# Patient Record
Sex: FEMALE | Race: WHITE | Employment: OTHER | URBAN - METROPOLITAN AREA
[De-identification: names, ages, dates, MRNs, and addresses within clinical notes are randomized per-mention and may not be internally consistent; named-entity substitution may affect disease eponyms.]

---

## 2018-11-30 ENCOUNTER — RECORDS - HEALTHEAST (OUTPATIENT)
Dept: LAB | Facility: CLINIC | Age: 78
End: 2018-11-30

## 2018-12-01 LAB — INR PPP: 1.63 (ref 0.9–1.1)

## 2018-12-02 ENCOUNTER — RECORDS - HEALTHEAST (OUTPATIENT)
Dept: LAB | Facility: CLINIC | Age: 78
End: 2018-12-02

## 2018-12-03 LAB — INR PPP: 1.84 (ref 0.9–1.1)

## 2018-12-04 ENCOUNTER — RECORDS - HEALTHEAST (OUTPATIENT)
Dept: LAB | Facility: CLINIC | Age: 78
End: 2018-12-04

## 2018-12-05 LAB
GAMMA INTERFERON BACKGROUND BLD IA-ACNC: 0.05 IU/ML
INR PPP: 2.92 (ref 0.9–1.1)
M TB IFN-G BLD-IMP: NEGATIVE
MITOGEN IGNF BCKGRD COR BLD-ACNC: 0.02 IU/ML
MITOGEN IGNF BCKGRD COR BLD-ACNC: 0.03 IU/ML
QTF INTERPRETATION: NORMAL
QTF MITOGEN - NIL: 6.68 IU/ML

## 2018-12-06 ENCOUNTER — RECORDS - HEALTHEAST (OUTPATIENT)
Dept: LAB | Facility: CLINIC | Age: 78
End: 2018-12-06

## 2018-12-07 ENCOUNTER — RECORDS - HEALTHEAST (OUTPATIENT)
Dept: LAB | Facility: CLINIC | Age: 78
End: 2018-12-07

## 2018-12-07 LAB — INR PPP: 3.89 (ref 0.9–1.1)

## 2018-12-09 ENCOUNTER — RECORDS - HEALTHEAST (OUTPATIENT)
Dept: LAB | Facility: CLINIC | Age: 78
End: 2018-12-09

## 2018-12-09 LAB
ANION GAP SERPL CALCULATED.3IONS-SCNC: 12 MMOL/L (ref 5–18)
BUN SERPL-MCNC: 20 MG/DL (ref 8–28)
CALCIUM SERPL-MCNC: 9.6 MG/DL (ref 8.5–10.5)
CHLORIDE BLD-SCNC: 104 MMOL/L (ref 98–107)
CO2 SERPL-SCNC: 25 MMOL/L (ref 22–31)
CREAT SERPL-MCNC: 1.35 MG/DL (ref 0.6–1.1)
ERYTHROCYTE [DISTWIDTH] IN BLOOD BY AUTOMATED COUNT: 15.1 % (ref 11–14.5)
GFR SERPL CREATININE-BSD FRML MDRD: 38 ML/MIN/1.73M2
GLUCOSE BLD-MCNC: 87 MG/DL (ref 70–125)
HCT VFR BLD AUTO: 41.1 % (ref 35–47)
HGB BLD-MCNC: 12.6 G/DL (ref 12–16)
INR PPP: 2.91 (ref 0.9–1.1)
MCH RBC QN AUTO: 31.4 PG (ref 27–34)
MCHC RBC AUTO-ENTMCNC: 30.7 G/DL (ref 32–36)
MCV RBC AUTO: 103 FL (ref 80–100)
PLATELET # BLD AUTO: 244 THOU/UL (ref 140–440)
PMV BLD AUTO: 11.4 FL (ref 8.5–12.5)
POTASSIUM BLD-SCNC: 4.3 MMOL/L (ref 3.5–5)
RBC # BLD AUTO: 4.01 MILL/UL (ref 3.8–5.4)
SODIUM SERPL-SCNC: 141 MMOL/L (ref 136–145)
WBC: 6.3 THOU/UL (ref 4–11)

## 2018-12-11 ENCOUNTER — RECORDS - HEALTHEAST (OUTPATIENT)
Dept: LAB | Facility: CLINIC | Age: 78
End: 2018-12-11

## 2018-12-12 LAB — INR PPP: 2.83 (ref 0.9–1.1)

## 2018-12-13 ENCOUNTER — RECORDS - HEALTHEAST (OUTPATIENT)
Dept: LAB | Facility: CLINIC | Age: 78
End: 2018-12-13

## 2018-12-14 LAB
ANION GAP SERPL CALCULATED.3IONS-SCNC: 10 MMOL/L (ref 5–18)
BUN SERPL-MCNC: 21 MG/DL (ref 8–28)
CALCIUM SERPL-MCNC: 9.9 MG/DL (ref 8.5–10.5)
CHLORIDE BLD-SCNC: 105 MMOL/L (ref 98–107)
CO2 SERPL-SCNC: 25 MMOL/L (ref 22–31)
CREAT SERPL-MCNC: 1.3 MG/DL (ref 0.6–1.1)
ERYTHROCYTE [DISTWIDTH] IN BLOOD BY AUTOMATED COUNT: 15.2 % (ref 11–14.5)
GFR SERPL CREATININE-BSD FRML MDRD: 39 ML/MIN/1.73M2
GLUCOSE BLD-MCNC: 86 MG/DL (ref 70–125)
HCT VFR BLD AUTO: 40.2 % (ref 35–47)
HGB BLD-MCNC: 12.5 G/DL (ref 12–16)
INR PPP: 1.07 (ref 0.9–1.1)
INR PPP: 3.02 (ref 0.9–1.1)
MCH RBC QN AUTO: 32 PG (ref 27–34)
MCHC RBC AUTO-ENTMCNC: 31.1 G/DL (ref 32–36)
MCV RBC AUTO: 103 FL (ref 80–100)
PLATELET # BLD AUTO: 231 THOU/UL (ref 140–440)
PMV BLD AUTO: 11.5 FL (ref 8.5–12.5)
POTASSIUM BLD-SCNC: 4.4 MMOL/L (ref 3.5–5)
RBC # BLD AUTO: 3.91 MILL/UL (ref 3.8–5.4)
SODIUM SERPL-SCNC: 140 MMOL/L (ref 136–145)
WBC: 5.9 THOU/UL (ref 4–11)

## 2023-04-03 ENCOUNTER — TRANSITIONAL CARE UNIT VISIT (OUTPATIENT)
Dept: GERIATRICS | Facility: CLINIC | Age: 83
End: 2023-04-03
Payer: COMMERCIAL

## 2023-04-03 VITALS
SYSTOLIC BLOOD PRESSURE: 147 MMHG | DIASTOLIC BLOOD PRESSURE: 87 MMHG | WEIGHT: 234.4 LBS | HEART RATE: 78 BPM | BODY MASS INDEX: 33.63 KG/M2 | OXYGEN SATURATION: 98 % | TEMPERATURE: 96.4 F | RESPIRATION RATE: 20 BRPM

## 2023-04-03 DIAGNOSIS — R53.81 PHYSICAL DECONDITIONING: ICD-10-CM

## 2023-04-03 DIAGNOSIS — F32.A DEPRESSION, UNSPECIFIED DEPRESSION TYPE: ICD-10-CM

## 2023-04-03 DIAGNOSIS — N39.0 URINARY TRACT INFECTION WITHOUT HEMATURIA, SITE UNSPECIFIED: ICD-10-CM

## 2023-04-03 DIAGNOSIS — A49.8 KLEBSIELLA PNEUMONIAE INFECTION: Primary | ICD-10-CM

## 2023-04-03 DIAGNOSIS — I10 BENIGN ESSENTIAL HYPERTENSION: ICD-10-CM

## 2023-04-03 DIAGNOSIS — I25.810 CORONARY ARTERY DISEASE INVOLVING AUTOLOGOUS ARTERY CORONARY BYPASS GRAFT WITHOUT ANGINA PECTORIS: ICD-10-CM

## 2023-04-03 DIAGNOSIS — I48.20 CHRONIC ATRIAL FIBRILLATION (H): ICD-10-CM

## 2023-04-03 DIAGNOSIS — N18.31 STAGE 3A CHRONIC KIDNEY DISEASE (H): ICD-10-CM

## 2023-04-03 DIAGNOSIS — F03.90 DEMENTIA, UNSPECIFIED DEMENTIA SEVERITY, UNSPECIFIED DEMENTIA TYPE, UNSPECIFIED WHETHER BEHAVIORAL, PSYCHOTIC, OR MOOD DISTURBANCE OR ANXIETY (H): ICD-10-CM

## 2023-04-03 DIAGNOSIS — I50.20 HEART FAILURE WITH REDUCED EJECTION FRACTION, NYHA CLASS I (H): ICD-10-CM

## 2023-04-03 DIAGNOSIS — E78.5 HYPERLIPIDEMIA, UNSPECIFIED HYPERLIPIDEMIA TYPE: ICD-10-CM

## 2023-04-03 PROCEDURE — 99309 SBSQ NF CARE MODERATE MDM 30: CPT | Performed by: NURSE PRACTITIONER

## 2023-04-03 RX ORDER — SENNOSIDES 8.6 MG
1 TABLET ORAL DAILY PRN
COMMUNITY

## 2023-04-03 RX ORDER — DULOXETIN HYDROCHLORIDE 30 MG/1
30 CAPSULE, DELAYED RELEASE ORAL DAILY
COMMUNITY

## 2023-04-03 RX ORDER — CLOTRIMAZOLE 1 %
CREAM (GRAM) TOPICAL 2 TIMES DAILY
COMMUNITY

## 2023-04-03 RX ORDER — GABAPENTIN 100 MG/1
100 CAPSULE ORAL EVERY EVENING
COMMUNITY

## 2023-04-03 RX ORDER — MIRABEGRON 25 MG/1
25 TABLET, FILM COATED, EXTENDED RELEASE ORAL DAILY
COMMUNITY

## 2023-04-03 RX ORDER — GABAPENTIN 300 MG/1
CAPSULE ORAL
COMMUNITY

## 2023-04-03 RX ORDER — TOLTERODINE 4 MG/1
4 CAPSULE, EXTENDED RELEASE ORAL DAILY
COMMUNITY
End: 2023-04-13

## 2023-04-03 RX ORDER — GABAPENTIN 300 MG/1
300 CAPSULE ORAL DAILY
COMMUNITY

## 2023-04-03 RX ORDER — ATORVASTATIN CALCIUM 20 MG/1
20 TABLET, FILM COATED ORAL AT BEDTIME
COMMUNITY

## 2023-04-03 RX ORDER — POTASSIUM CHLORIDE 750 MG/1
10 TABLET, EXTENDED RELEASE ORAL DAILY
COMMUNITY

## 2023-04-03 RX ORDER — ARIPIPRAZOLE 2 MG/1
2 TABLET ORAL DAILY
COMMUNITY

## 2023-04-03 RX ORDER — METOPROLOL SUCCINATE 100 MG/1
100 TABLET, EXTENDED RELEASE ORAL DAILY
COMMUNITY

## 2023-04-03 RX ORDER — VITAMIN B COMPLEX
1 TABLET ORAL DAILY
COMMUNITY

## 2023-04-03 RX ORDER — CARBOXYMETHYLCELLULOSE SODIUM 5 MG/ML
1 SOLUTION/ DROPS OPHTHALMIC 4 TIMES DAILY
COMMUNITY

## 2023-04-03 RX ORDER — ALBUTEROL SULFATE 90 UG/1
2 AEROSOL, METERED RESPIRATORY (INHALATION) EVERY 4 HOURS PRN
COMMUNITY

## 2023-04-03 RX ORDER — GABAPENTIN 100 MG/1
CAPSULE ORAL
COMMUNITY

## 2023-04-03 RX ORDER — SENNOSIDES 8.6 MG
1300 CAPSULE ORAL 3 TIMES DAILY
COMMUNITY

## 2023-04-03 RX ORDER — ISOSORBIDE MONONITRATE 60 MG/1
60 TABLET, EXTENDED RELEASE ORAL DAILY
COMMUNITY

## 2023-04-03 NOTE — PROGRESS NOTES
[facility] GERIATRICS  PRIMARY CARE PROVIDER AND CLINIC:  Deidra Dick NP, [address] / [facility] [address]  Chief Complaint   Patient presents with     Hospital F/U      [place] Medical Record Number:  [id number]  Place of Service where encounter took place:  [facility] (TCU) [[id number]]    Carmelina Gaspar  is a 82 year old  (1940), admitted to the above facility from  [facility] . Hospital stay 3/25/23 through 3/30/23.     HPI:    This is an 82-year-old female, with a past medical history significant for chronic atrial fibrillation, CVA, mixed Alzheimer's and vascular dementia, right vision loss, chronic kidney disease stage III, type 2 diabetes mellitus, hypothyroidism, and depression, who was admitted to [facility] 3/25/23 through 3/30/23 for dyspnea and dysuria. Antibiotics were initiated. A urine culture revealed > 100,000 CFU/ml Klebsiella pneumoniae. Became dyspneic on 3/26/23. BNP elevated. Received extra dose of IV Furosemide. A TCU stay was recommended for physical rehabilitation.     Today, patient is found resting in bed. Just finished therapy and felt it went pretty good. When asked about her hospitalization, states she fell and hurt herself, resulting in the hospitalization. Uses a walker to get around. Therapy had to make some modifications to her walker today and that made it better. States she used to live in [place], [place] when asked where she lived prior to hospitalization.     CODE STATUS/ADVANCE DIRECTIVES DISCUSSION:  No CPR- Do NOT Intubate    ALLERGIES:   Allergies   Allergen Reactions     Gabapentin      Levofloxacin      Etodolac      Oxycodone       PAST MEDICAL HISTORY:   Past Medical History:   Diagnosis Date     Atrial fibrillation (H)       PAST SURGICAL HISTORY:   has no past surgical history on file.  FAMILY HISTORY: family history is not on file.  SOCIAL HISTORY:   reports that she has quit smoking. She does not have  any smokeless tobacco history on file. She reports that she does not drink alcohol and does not use drugs.  Patient's living condition: lives in an assisted living facility - [facility]    Post Discharge Medication Reconciliation Status:   MED REC REQUIRED  Post Medication Reconciliation Status: discharge medications reconciled, continue medications without change     Current Outpatient Medications   Medication Sig     acetaminophen (ARTHRITIS PAIN APAP) 650 MG CR tablet Take 1,300 mg by mouth 3 times daily     albuterol (PROAIR HFA/PROVENTIL HFA/VENTOLIN HFA) 108 (90 Base) MCG/ACT inhaler Inhale 2 puffs into the lungs every 4 hours as needed for shortness of breath, wheezing or cough     apixaban ANTICOAGULANT (ELIQUIS) 5 MG tablet Take 5 mg by mouth 2 times daily     ARIPiprazole (ABILIFY) 2 MG tablet Take 2 mg by mouth daily     atorvastatin (LIPITOR) 20 MG tablet Take 20 mg by mouth daily     carboxymethylcellulose PF (REFRESH PLUS) 0.5 % ophthalmic solution Place 1 drop into both eyes 4 times daily     clotrimazole (LOTRIMIN) 1 % external cream Apply topically 2 times daily     DULoxetine (CYMBALTA) 30 MG capsule Take 30 mg by mouth daily AND  60 mg, oral, Once A Day, Take with 30mg capsule to equal 90mg daily     FUROSEMIDE PO Take 20 mg by mouth daily.     gabapentin (NEURONTIN) 100 MG capsule Take 100 mg by mouth every evening     gabapentin (NEURONTIN) 100 MG capsule 100 mg, oral, Once A Day, Take with 300mg capsule at 2pm to equal 400mg total     gabapentin (NEURONTIN) 300 MG capsule Take 300 mg by mouth daily 0800     gabapentin (NEURONTIN) 300 MG capsule 300 mg, oral, Once A Day, Take with 100mg capsule to equal 400mg total at 2pm     isosorbide mononitrate (IMDUR) 60 MG 24 hr tablet Take 60 mg by mouth daily     LEVOTHYROXINE SODIUM PO Take 137 mcg by mouth daily     metoprolol succinate ER (TOPROL XL) 100 MG 24 hr tablet Take 100 mg by mouth daily     mirabegron (MYRBETRIQ) 25 MG 24 hr  tablet Take 25 mg by mouth daily     OMEPRAZOLE PO Take 20 mg by mouth daily     potassium chloride ER (KLOR-CON M) 10 MEQ CR tablet Take 10 mEq by mouth daily     sennosides (SENOKOT) 8.6 MG tablet Take 1 tablet by mouth daily as needed for constipation     tolterodine ER (DETROL LA) 4 MG 24 hr capsule Take 4 mg by mouth daily     vitamin B-12 (CYANOCOBALAMIN) 1000 MCG tablet Take 1,000 mcg by mouth daily     vitamin B-Complex Take 1 tablet by mouth daily     Vitamin D3 (CHOLECALCIFEROL) 25 mcg (1000 units) tablet Take 1 tablet by mouth daily     Zinc Oxide 12 % CREA Externally apply topically 2 times daily     No current facility-administered medications for this visit.     ROS:  4 point ROS including Respiratory, CV, GI and , other than that noted in the HPI,  is negative    Vitals:  BP (!) 147/87   Pulse 78   Temp (!) 96.4  F (35.8  C)   Resp 20   Wt 106.3 kg (234 lb 6.4 oz)   SpO2 98%   BMI 33.63 kg/m    Exam:  GENERAL APPEARANCE:  Alert, in no distress  ENT:  Mouth and posterior oropharynx normal, moist mucous membranes  EYES:  EOM, conjunctivae, lids, pupils and irises normal  RESP:  respiratory effort and palpation of chest normal, lungs clear to auscultation , no respiratory distress  CV:  Palpation and auscultation of heart done , regular rate and rhythm, no murmur, rub, or gallop  ABDOMEN:  normal bowel sounds, soft, nontender, no hepatosplenomegaly or other masses  M/S:   Trace edema in BLE  SKIN:  Inspection of skin and subcutaneous tissue baseline, Palpation of skin and subcutaneous tissue baseline  NEURO:   Cranial nerves 2-12 are normal tested and grossly at patient's baseline  PSYCH:  oriented to person    Lab/Diagnostic data:  Labs done in SNF are in [place] EPIC. Please refer to them using [employer]/Care Everywhere.    ASSESSMENT/PLAN:  Klebsiella Pneumoniae Urinary Tract Infection with Urinary Incontinence. Completed antibiotics for treatment 3/30/23. Continue Mirabegron and Tolterodine as  ordered.     Heart Failure with Reduced Ejection Fraction 35% on 1/27/22. Required IV diuresis during hospitalization due to dyspnea and elevated BNP. Monitor weights daily. On Furosemide. Albuterol available PRN for shortness of breath.     Coronary Artery Disease S/P CABG, Hypertension and Hyperlipidemia with History of CVA. Followed by [facility]. Monitor blood pressure. Continue Atorvastatin, Isosorbide Mononitrate, and Metoprolol as ordered. Also on Furosemide.     Chronic Atrial Fibrillation. Monitor heart rate daily. Continue Apixaban and Metoprolol as ordered.    Type 2 Diabetes Mellitus. Last A1C 6.5 on 7/20/22. Diet-controlled. Monitor blood sugars PRN is symptomatic.     Mixed Alzheimer's and Vascular Dementia. Occupational Therapy to evaluate cognitive status. L[facility] at [facility].    Depression. Seen by PCP on 3/9/23 for worsening depression. PHQ-9 Score 11. Started on Aripiprazole. Continue Duloxetine as ordered.    Chronic Back Pain with Left Hip Osteoarthritis. Has received steroid injections in the left hip in the past. Continue Acetaminophen and Gabapentin as ordered.    Chronic Kidney Disease Stage III. Baseline Creatinine low 1s. Last Creatinine 1.28 on 3/30/23. Repeat BMP to ensure stability.     Hypothyroidism. Last TSH 1.37 on 7/20/22. Continue Levothyroxine as ordered.    Hypokalemia. Secondary to diuretic. Last Potassium 4.3 on 3/27/23. Continue Potassium Chloride as ordered.    Gastroesophageal Reflux Disease with History of Achalasia of Esophagus. Continue Omeprazole as ordered.    Constipation. Continue Senna as ordered.    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 Followed by Retina Clinic at [facility]. Receives KERRI injection every 4 weeks. Also on Refresh Tears.    Physical Deconditioning. Secondary to recent hospitalization and co-morbidities. Physical and Occupational Therapy ordered.    Orders:  BMP on 4/11/23  Daily  weights    Electronically signed by:  VISH Hutchinson CNP

## 2023-04-03 NOTE — LETTER
4/3/2023        RE: Carmelina Gaspar  [id number]  [facility] [address]        [facility] GERIATRICS  PRIMARY CARE PROVIDER AND CLINIC:  Deidra Dick NP, [address] / [facility] [address]  Chief Complaint   Patient presents with     Hospital F/U      [place] Medical Record Number:  [id number]  [facility] where [facility] [[id number]    Carmelina Gaspar  is a 82 year old  (1940), admitted to the above facility from  [facility] . Hospital stay 3/25/23 through 3/30/23.     HPI:    This is an 82-year-old female, with a past medical history significant for chronic atrial fibrillation, CVA, mixed Alzheimer's and vascular dementia, right vision loss, chronic kidney disease stage III, type 2 diabetes mellitus, hypothyroidism, and depression, who was admitted to [facility] 3/25/23 through 3/30/23 for dyspnea and dysuria. Antibiotics were initiated. A urine culture revealed > 100,000 CFU/ml Klebsiella pneumoniae. Became dyspneic on 3/26/23. BNP elevated. Received extra dose of IV Furosemide. A TCU stay was recommended for physical rehabilitation.     Today, patient is found resting in bed. Just finished therapy and felt it went pretty good. When asked about her hospitalization, states she fell and hurt herself, resulting in the hospitalization. Uses a walker to get around. Therapy had to make some modifications to her walker today and that made it better. States she used to live in [place], [place] when asked where she lived prior to hospitalization.     CODE STATUS/ADVANCE DIRECTIVES DISCUSSION:  No CPR- Do NOT Intubate    ALLERGIES:   Allergies   Allergen Reactions     Gabapentin      Levofloxacin      Etodolac      Oxycodone       PAST MEDICAL HISTORY:   Past Medical History:   Diagnosis Date     Atrial fibrillation (H)       PAST SURGICAL HISTORY:   has no past surgical history on file.  FAMILY HISTORY: family  history is not on file.  SOCIAL HISTORY:   reports that she has quit smoking. She does not have any smokeless tobacco history on file. She reports that she does not drink alcohol and does not use drugs.  Patient's living condition: lives in an assisted living facility - [facility]    Post Discharge Medication Reconciliation Status:   MED REC REQUIRED  Post Medication Reconciliation Status: discharge medications reconciled, continue medications without change     Current Outpatient Medications   Medication Sig     acetaminophen (ARTHRITIS PAIN APAP) 650 MG CR tablet Take 1,300 mg by mouth 3 times daily     albuterol (PROAIR HFA/PROVENTIL HFA/VENTOLIN HFA) 108 (90 Base) MCG/ACT inhaler Inhale 2 puffs into the lungs every 4 hours as needed for shortness of breath, wheezing or cough     apixaban ANTICOAGULANT (ELIQUIS) 5 MG tablet Take 5 mg by mouth 2 times daily     ARIPiprazole (ABILIFY) 2 MG tablet Take 2 mg by mouth daily     atorvastatin (LIPITOR) 20 MG tablet Take 20 mg by mouth daily     carboxymethylcellulose PF (REFRESH PLUS) 0.5 % ophthalmic solution Place 1 drop into both eyes 4 times daily     clotrimazole (LOTRIMIN) 1 % external cream Apply topically 2 times daily     DULoxetine (CYMBALTA) 30 MG capsule Take 30 mg by mouth daily AND  60 mg, oral, Once A Day, Take with 30mg capsule to equal 90mg daily     FUROSEMIDE PO Take 20 mg by mouth daily.     gabapentin (NEURONTIN) 100 MG capsule Take 100 mg by mouth every evening     gabapentin (NEURONTIN) 100 MG capsule 100 mg, oral, Once A Day, Take with 300mg capsule at 2pm to equal 400mg total     gabapentin (NEURONTIN) 300 MG capsule Take 300 mg by mouth daily 0800     gabapentin (NEURONTIN) 300 MG capsule 300 mg, oral, Once A Day, Take with 100mg capsule to equal 400mg total at 2pm     isosorbide mononitrate (IMDUR) 60 MG 24 hr tablet Take 60 mg by mouth daily     LEVOTHYROXINE SODIUM PO Take 137 mcg by mouth daily     metoprolol succinate ER  (TOPROL XL) 100 MG 24 hr tablet Take 100 mg by mouth daily     mirabegron (MYRBETRIQ) 25 MG 24 hr tablet Take 25 mg by mouth daily     OMEPRAZOLE PO Take 20 mg by mouth daily     potassium chloride ER (KLOR-CON M) 10 MEQ CR tablet Take 10 mEq by mouth daily     sennosides (SENOKOT) 8.6 MG tablet Take 1 tablet by mouth daily as needed for constipation     tolterodine ER (DETROL LA) 4 MG 24 hr capsule Take 4 mg by mouth daily     vitamin B-12 (CYANOCOBALAMIN) 1000 MCG tablet Take 1,000 mcg by mouth daily     vitamin B-Complex Take 1 tablet by mouth daily     Vitamin D3 (CHOLECALCIFEROL) 25 mcg (1000 units) tablet Take 1 tablet by mouth daily     Zinc Oxide 12 % CREA Externally apply topically 2 times daily     No current facility-administered medications for this visit.     ROS:  4 point ROS including Respiratory, CV, GI and , other than that noted in the HPI,  is negative    Vitals:  BP (!) 147/87   Pulse 78   Temp (!) 96.4  F (35.8  C)   Resp 20   Wt 106.3 kg (234 lb 6.4 oz)   SpO2 98%   BMI 33.63 kg/m    Exam:  GENERAL APPEARANCE:  Alert, in no distress  ENT:  Mouth and posterior oropharynx normal, moist mucous membranes  EYES:  EOM, conjunctivae, lids, pupils and irises normal  RESP:  respiratory effort and palpation of chest normal, lungs clear to auscultation , no respiratory distress  CV:  Palpation and auscultation of heart done , regular rate and rhythm, no murmur, rub, or gallop  ABDOMEN:  normal bowel sounds, soft, nontender, no hepatosplenomegaly or other masses  M/S:   Trace edema in BLE  SKIN:  Inspection of skin and subcutaneous tissue baseline, Palpation of skin and subcutaneous tissue baseline  NEURO:   Cranial nerves 2-12 are normal tested and grossly at patient's baseline  PSYCH:  oriented to person    Lab/Diagnostic data:  Labs done in SNF are in [place] EPIC. Please refer to them using [employer]/Care Everywhere.    ASSESSMENT/PLAN:  Klebsiella Pneumoniae Urinary Tract Infection with Urinary  Incontinence. Completed antibiotics for treatment 3/30/23. Continue Mirabegron and Tolterodine as ordered.     Heart Failure with Reduced Ejection Fraction 35% on 1/27/22. Required IV diuresis during hospitalization due to dyspnea and elevated BNP. Monitor weights daily. On Furosemide. Albuterol available PRN for shortness of breath.     Coronary Artery Disease S/P CABG, Hypertension and Hyperlipidemia with History of CVA. Followed by [facility]. Monitor blood pressure. Continue Atorvastatin, Isosorbide Mononitrate, and Metoprolol as ordered. Also on Furosemide.     Chronic Atrial Fibrillation. Monitor heart rate daily. Continue Apixaban and Metoprolol as ordered.    Type 2 Diabetes Mellitus. Last A1C 6.5 on 7/20/22. Diet-controlled. Monitor blood sugars PRN is symptomatic.     Mixed Alzheimer's and Vascular Dementia. Occupational Therapy to evaluate cognitive status. Lived at [facility].    Depression. Seen by PCP on 3/9/23 for worsening depression. PHQ-9 Score 11. Started on Aripiprazole. Continue Duloxetine as ordered.    Chronic Back Pain with Left Hip Osteoarthritis. Has received steroid injections in the left hip in the past. Continue Acetaminophen and Gabapentin as ordered.    Chronic Kidney Disease Stage III. Baseline Creatinine low 1s. Last Creatinine 1.28 on 3/30/23. Repeat BMP to ensure stability.     Hypothyroidism. Last TSH 1.37 on 7/20/22. Continue Levothyroxine as ordered.    Hypokalemia. Secondary to diuretic. Last Potassium 4.3 on 3/27/23. Continue Potassium Chloride as ordered.    Gastroesophageal Reflux Disease with History of Achalasia of Esophagus. Continue Omeprazole as ordered.    Constipation. Continue Senna as ordered.    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 Followed by [facility] Clinic at [facility]. Receives KERRI injection every 4 weeks. Also on Refresh Tears.    Physical Deconditioning. Secondary to recent hospitalization and  co-morbidities. Physical and Occupational Therapy ordered.    Orders:  BMP on 4/11/23  Daily weights    Electronically signed by:  VISH Hutchinson CNP                       Sincerely,        VISH Hutchinson CNP

## 2023-04-06 ENCOUNTER — TRANSITIONAL CARE UNIT VISIT (OUTPATIENT)
Dept: GERIATRICS | Facility: CLINIC | Age: 83
End: 2023-04-06
Payer: COMMERCIAL

## 2023-04-06 VITALS
DIASTOLIC BLOOD PRESSURE: 95 MMHG | SYSTOLIC BLOOD PRESSURE: 142 MMHG | BODY MASS INDEX: 33.63 KG/M2 | WEIGHT: 234.4 LBS | RESPIRATION RATE: 20 BRPM | TEMPERATURE: 96.7 F | OXYGEN SATURATION: 96 % | HEART RATE: 79 BPM

## 2023-04-06 DIAGNOSIS — R53.81 PHYSICAL DECONDITIONING: ICD-10-CM

## 2023-04-06 DIAGNOSIS — I10 BENIGN ESSENTIAL HYPERTENSION: ICD-10-CM

## 2023-04-06 DIAGNOSIS — I50.20 HEART FAILURE WITH REDUCED EJECTION FRACTION, NYHA CLASS I (H): Primary | ICD-10-CM

## 2023-04-06 PROCEDURE — 99309 SBSQ NF CARE MODERATE MDM 30: CPT | Performed by: NURSE PRACTITIONER

## 2023-04-06 NOTE — PROGRESS NOTES
[facility] GERIATRICS    Chief Complaint   Patient presents with     RECHECK     HPI:  Carmelina Gaspar is a 82 year old  (1940), who is being seen today for an episodic care visit at: [facility] [[id number]].    Background:    This is an 82-year-old female, with a past medical history significant for chronic atrial fibrillation, CVA, mixed Alzheimer's and vascular dementia, right vision loss, chronic kidney disease stage III, type 2 diabetes mellitus, hypothyroidism, and depression, who was admitted to [facility] 3/25/23 through 3/30/23 for dyspnea and dysuria. Antibiotics were initiated. A urine culture revealed > 100,000 CFU/ml Klebsiella pneumoniae. Became dyspneic on 3/26/23. BNP elevated. Received extra dose of IV Furosemide. A TCU stay was recommended for physical rehabilitation.    Today's concern is:     Physical Deconditioning. Working with Physical and Occupational Therapy. Upon review of documentation, CGA needed with transfers. Ambulating 35 feet with FWW and CGA. Moderate assistance needed with lower body dressing. SLUMS 3/30. Safety Questionnaire 7/12.     Heart Failure with Reduced Ejection Fraction 35% on 1/27/22. Denies shortness of breath. Upon review of weights, 234.4 lbs on 3/30/23. Due for repeat weight on 4/7/23. Hospital discharge weight 235 lbs on 3/25/23.    Hypertension. Upon review of blood pressures over the past 5 days, systolic range from 111-160, most < 150. Diastolic range from 68-95.    Allergies, and PMH/PSH reviewed in EPIC today.  REVIEW OF SYSTEMS:  4 point ROS including Respiratory, CV, GI and , other than that noted in the HPI,  is negative    Objective:   BP (!) 142/95   Pulse 79   Temp (!) 96.7  F (35.9  C)   Resp 20   Wt 106.3 kg (234 lb 6.4 oz)   SpO2 96%   BMI 33.63 kg/m    GENERAL APPEARANCE:  Alert, in no distress  ENT:  Mouth and posterior oropharynx normal, moist mucous membranes  EYES:  EOM, conjunctivae, lids, pupils and  irises normal  RESP:  respiratory effort and palpation of chest normal, lungs clear to auscultation , no respiratory distress  CV:  Palpation and auscultation of heart done , regular rate and rhythm, no murmur, rub, or gallop  ABDOMEN:  normal bowel sounds, soft, nontender, no hepatosplenomegaly or other masses  M/S:   Trace edema in BLE  SKIN:  Inspection of skin and subcutaneous tissue baseline, Palpation of skin and subcutaneous tissue baseline  NEURO:   Cranial nerves 2-12 are normal tested and grossly at patient's baseline  PSYCH:  memory impaired     Labs done in SNF are in [facility]. Please refer to them using [employer]/Care Everywhere.    Assessment/Plan:  Klebsiella Pneumoniae Urinary Tract Infection with Urinary Incontinence. Completed antibiotic treatment 3/30/23. Continue Mirabegron and Tolterodine as ordered.      Heart Failure with Reduced Ejection Fraction 35% on 1/27/22. Required IV diuresis during hospitalization due to dyspnea and elevated BNP. Monitor weights. On Furosemide. Albuterol available PRN for shortness of breath.      Coronary Artery Disease S/P CABG, Hypertension and Hyperlipidemia with History of CVA. Followed by [facility]. Most blood pressures < 150/90. Continue Atorvastatin, Isosorbide Mononitrate, and Metoprolol as ordered. Also on Furosemide.      Chronic Atrial Fibrillation. Monitor heart rate daily. Continue Apixaban and Metoprolol as ordered.     Type 2 Diabetes Mellitus. Last A1C 6.5 on 7/20/22. Diet-controlled. Monitor blood sugars PRN is symptomatic.      Mixed Alzheimer's and Vascular Dementia. SLUMS Score 3/30 per OT testing. Lived at [facility].     Depression. Seen by PCP on 3/9/23 for worsening depression. PHQ-9 Score 11. Started on Aripiprazole. Continue Duloxetine as ordered.     Chronic Back Pain with Left Hip Osteoarthritis. Has received steroid injections in the left hip in the past. Continue Acetaminophen and Gabapentin as  ordered.     Chronic Kidney Disease Stage III. Baseline Creatinine low 1s. Last Creatinine 1.28 on 3/30/23. Monitor periodically to ensure stability.      Hypothyroidism. Last TSH 1.37 on 7/20/22. Continue Levothyroxine as ordered.     Hypokalemia. Secondary to diuretic. Last Potassium 4.3 on 3/27/23. Continue Potassium Chloride as ordered.     Gastroesophageal Reflux Disease with History of Achalasia of Esophagus. Continue Omeprazole as ordered.     Constipation. Continue Senna as ordered.     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 [facility]. Receives KERRI injection every 4 weeks. Also on Refresh Tears.     Physical Deconditioning. Secondary to recent hospitalization and co-morbidities. Physical and Occupational Therapy ordered.    Orders:  None    Electronically signed by: VISH Hutchinson CNP

## 2023-04-06 NOTE — LETTER
4/6/2023        RE: Carmelina Gaspar  [address]  [facility] [address]        [facility] GERIATRICS    Chief Complaint   Patient presents with     RECHECK     HPI:  Carmelina Gaspar is a 82 year old  (1940), who is being seen today for an episodic care visit at: [facility] ([facility]) [[id number]].    Background:    This is an 82-year-old female, with a past medical history significant for chronic atrial fibrillation, CVA, mixed Alzheimer's and vascular dementia, right vision loss, chronic kidney disease stage III, type 2 diabetes mellitus, hypothyroidism, and depression, who was admitted to [facility] 3/25/23 through 3/30/23 for dyspnea and dysuria. Antibiotics were initiated. A urine culture revealed > 100,000 CFU/ml Klebsiella pneumoniae. Became dyspneic on 3/26/23. BNP elevated. Received extra dose of IV Furosemide. A TCU stay was recommended for physical rehabilitation.    Today's concern is:     Physical Deconditioning. Working with Physical and Occupational Therapy. Upon review of documentation, CGA needed with transfers. Ambulating 35 feet with FWW and CGA. Moderate assistance needed with lower body dressing. SLUMS 3/30. Safety Questionnaire 7/12.     Heart Failure with Reduced Ejection Fraction 35% on 1/27/22. Denies shortness of breath. Upon review of weights, 234.4 lbs on 3/30/23. Due for repeat weight on 4/7/23. Hospital discharge weight 235 lbs on 3/25/23.    Hypertension. Upon review of blood pressures over the past 5 days, systolic range from 111-160, most < 150. Diastolic range from 68-95.    Allergies, and PMH/PSH reviewed in EPIC today.  REVIEW OF SYSTEMS:  4 point ROS including Respiratory, CV, GI and , other than that noted in the HPI,  is negative    Objective:   BP (!) 142/95   Pulse 79   Temp (!) 96.7  F (35.9  C)   Resp 20   Wt 106.3 kg (234 lb 6.4 oz)   SpO2 96%   BMI 33.63 kg/m    GENERAL APPEARANCE:  Alert, in no distress  ENT:  Mouth and  posterior oropharynx normal, moist mucous membranes  EYES:  EOM, conjunctivae, lids, pupils and irises normal  RESP:  respiratory effort and palpation of chest normal, lungs clear to auscultation , no respiratory distress  CV:  Palpation and auscultation of heart done , regular rate and rhythm, no murmur, rub, or gallop  ABDOMEN:  normal bowel sounds, soft, nontender, no hepatosplenomegaly or other masses  M/S:   Trace edema in BLE  SKIN:  Inspection of skin and subcutaneous tissue baseline, Palpation of skin and subcutaneous tissue baseline  NEURO:   Cranial nerves 2-12 are normal tested and grossly at patient's baseline  PSYCH:  memory impaired     Labs done in SNF are in [facility]. Please refer to them using [employer]/Care Everywhere.    Assessment/Plan:  Klebsiella Pneumoniae Urinary Tract Infection with Urinary Incontinence. Completed antibiotic treatment 3/30/23. Continue Mirabegron and Tolterodine as ordered.      Heart Failure with Reduced Ejection Fraction 35% on 1/27/22. Required IV diuresis during hospitalization due to dyspnea and elevated BNP. Monitor weights. On Furosemide. Albuterol available PRN for shortness of breath.      Coronary Artery Disease S/P CABG, Hypertension and Hyperlipidemia with History of CVA. Followed by [facility]. Most blood pressures < 150/90. Continue Atorvastatin, Isosorbide Mononitrate, and Metoprolol as ordered. Also on Furosemide.      Chronic Atrial Fibrillation. Monitor heart rate daily. Continue Apixaban and Metoprolol as ordered.     Type 2 Diabetes Mellitus. Last A1C 6.5 on 7/20/22. Diet-controlled. Monitor blood sugars PRN is symptomatic.      Mixed Alzheimer's and Vascular Dementia. SLUMS Score 3/30 per OT testing. Lived at [facility].     Depression. Seen by PCP on 3/9/23 for worsening depression. PHQ-9 Score 11. Started on Aripiprazole. Continue Duloxetine as ordered.     Chronic Back Pain with Left Hip Osteoarthritis. Has received steroid  injections in the left hip in the past. Continue Acetaminophen and Gabapentin as ordered.     Chronic Kidney Disease Stage III. Baseline Creatinine low 1s. Last Creatinine 1.28 on 3/30/23. Monitor periodically to ensure stability.      Hypothyroidism. Last TSH 1.37 on 7/20/22. Continue Levothyroxine as ordered.     Hypokalemia. Secondary to diuretic. Last Potassium 4.3 on 3/27/23. Continue Potassium Chloride as ordered.     Gastroesophageal Reflux Disease with History of Achalasia of Esophagus. Continue Omeprazole as ordered.     Constipation. Continue Senna as ordered.     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 Followed by [facility] at [facility]. Receives KERRI injection every 4 weeks. Also on Refresh Tears.     Physical Deconditioning. Secondary to recent hospitalization and co-morbidities. Physical and Occupational Therapy ordered.    Orders:  None    Electronically signed by: VISH Hutchinson CNP             Sincerely,        VISH Hutchinson CNP

## 2023-04-10 VITALS
BODY MASS INDEX: 33.2 KG/M2 | RESPIRATION RATE: 14 BRPM | SYSTOLIC BLOOD PRESSURE: 129 MMHG | WEIGHT: 231.4 LBS | DIASTOLIC BLOOD PRESSURE: 84 MMHG | HEART RATE: 68 BPM | TEMPERATURE: 97.1 F | OXYGEN SATURATION: 91 %

## 2023-04-11 ENCOUNTER — TRANSITIONAL CARE UNIT VISIT (OUTPATIENT)
Dept: GERIATRICS | Facility: CLINIC | Age: 83
End: 2023-04-11
Payer: COMMERCIAL

## 2023-04-11 DIAGNOSIS — F33.9 RECURRENT MAJOR DEPRESSIVE DISORDER, REMISSION STATUS UNSPECIFIED (H): ICD-10-CM

## 2023-04-11 DIAGNOSIS — N18.30 STAGE 3 CHRONIC KIDNEY DISEASE, UNSPECIFIED WHETHER STAGE 3A OR 3B CKD (H): ICD-10-CM

## 2023-04-11 DIAGNOSIS — R35.0 URINARY FREQUENCY: ICD-10-CM

## 2023-04-11 DIAGNOSIS — R53.81 PHYSICAL DECONDITIONING: ICD-10-CM

## 2023-04-11 DIAGNOSIS — F03.90 DEMENTIA, UNSPECIFIED DEMENTIA SEVERITY, UNSPECIFIED DEMENTIA TYPE, UNSPECIFIED WHETHER BEHAVIORAL, PSYCHOTIC, OR MOOD DISTURBANCE OR ANXIETY (H): ICD-10-CM

## 2023-04-11 DIAGNOSIS — I48.20 CHRONIC ATRIAL FIBRILLATION (H): ICD-10-CM

## 2023-04-11 DIAGNOSIS — I50.23 ACUTE ON CHRONIC SYSTOLIC HEART FAILURE (H): ICD-10-CM

## 2023-04-11 DIAGNOSIS — Z87.440 PERSONAL HISTORY OF URINARY TRACT INFECTION: Primary | ICD-10-CM

## 2023-04-11 PROCEDURE — 99305 1ST NF CARE MODERATE MDM 35: CPT | Performed by: INTERNAL MEDICINE

## 2023-04-11 NOTE — PROGRESS NOTES
"[place] GERIATRIC SERVICES  PHYSICIAN NOTE    PRIMARY CARE PROVIDER AND CLINIC:  Deidra Dick NP, [address] / [facility] [address]    Chief Complaint   Patient presents with     Hospital F/U     [place] Medical Record Number:  [id number]  Place of Service where encounter took place:  [facility] (U) [[id number]]    Carmelina Gaspar is a 82 year old (1940), admitted to the above facility from  [facility] . Hospital stay 3/25/23 through 3/30/23. Admitted to this facility for  rehab, medical management and nursing care.     HPI:    HPI information obtained from: facility chart records, facility staff, patient report, [facility] chart review and Care Everywhere [facility] chart review.     Brief summary of hospital course: Carmelina Gaspar is an 82yoF with h/o depression, chronic pain, dementia, afib on anticoagulation, CHF and urinary incontinence admitted from her assisted with dysuria and dyspnea found to have UTI treated with antibiotics and acute on chronic CHF with slight fluid overload s/p diuresis. Ultimately discharged with remaining Cefuroxime antibiotics and back to home dosing Lasix. Discharged to TCU for rehab/cares.    Updates on status since skilled nursing admission: Carmelina is seen in her room today and welcomes the visit. Says she is \"good\" and denies dyspnea at rest but admits to some dyspnea on exertion. Breathing comfortably on RA. No cough. No current UTI symptoms but mentions chronic urinary frequency that seems unchanged since she had started her Detrol and/or Myrbetriq. Willing to go off of Detrol given comorbid dementia and h/o dry mouth. Says she gets medication assistance at her [facility]. After our visit she asks about going out to sit in common area to be around others so I do help her get out there though she was able to self transfer. No acute nursing concerns.    CODE STATUS/ADVANCE DIRECTIVES DISCUSSION:  DNR  Patient's living condition: lives in an assisted living " facility    ALLERGIES: Gabapentin, Levofloxacin, Etodolac, and Oxycodone    Past Medical History:   Diagnosis Date     Atrial fibrillation (H)      CAD (coronary artery disease)      Central retinal vein occlusion of right eye      CKD (chronic kidney disease) stage 3, GFR 30-59 ml/min (H)      Dementia (H)      Depression      Hypothyroidism      Ischemic cardiomyopathy      Urinary incontinence       Past Surgical History:   Procedure Laterality Date     CHOLECYSTECTOMY       OTHER SURGICAL HISTORY  03/2017    Endovascular stent (Dr. Devine) for infrarenal AAA     REPLACEMENT TOTAL KNEE Bilateral        Current Outpatient Medications   Medication Sig Dispense Refill     acetaminophen (ARTHRITIS PAIN APAP) 650 MG CR tablet Take 1,300 mg by mouth 3 times daily       albuterol (PROAIR HFA/PROVENTIL HFA/VENTOLIN HFA) 108 (90 Base) MCG/ACT inhaler Inhale 2 puffs into the lungs every 4 hours as needed for shortness of breath, wheezing or cough       apixaban ANTICOAGULANT (ELIQUIS) 5 MG tablet Take 5 mg by mouth 2 times daily       ARIPiprazole (ABILIFY) 2 MG tablet Take 2 mg by mouth daily       atorvastatin (LIPITOR) 20 MG tablet Take 20 mg by mouth daily       carboxymethylcellulose PF (REFRESH PLUS) 0.5 % ophthalmic solution Place 1 drop into both eyes 4 times daily       clotrimazole (LOTRIMIN) 1 % external cream Apply topically 2 times daily       DULoxetine (CYMBALTA) 30 MG capsule Take 30 mg by mouth daily AND  60 mg, oral, Once A Day, Take with 30mg capsule to equal 90mg daily       FUROSEMIDE PO Take 20 mg by mouth daily.       gabapentin (NEURONTIN) 100 MG capsule Take 100 mg by mouth every evening       gabapentin (NEURONTIN) 100 MG capsule 100 mg, oral, Once A Day, Take with 300mg capsule at 2pm to equal 400mg total       gabapentin (NEURONTIN) 300 MG capsule Take 300 mg by mouth daily 0800       gabapentin (NEURONTIN) 300 MG capsule 300 mg, oral, Once A Day, Take with 100mg capsule to equal 400mg total at  2pm       isosorbide mononitrate (IMDUR) 60 MG 24 hr tablet Take 60 mg by mouth daily       LEVOTHYROXINE SODIUM PO Take 137 mcg by mouth daily       metoprolol succinate ER (TOPROL XL) 100 MG 24 hr tablet Take 100 mg by mouth daily       mirabegron (MYRBETRIQ) 25 MG 24 hr tablet Take 25 mg by mouth daily       OMEPRAZOLE PO Take 20 mg by mouth daily       potassium chloride ER (KLOR-CON M) 10 MEQ CR tablet Take 10 mEq by mouth daily       sennosides (SENOKOT) 8.6 MG tablet Take 1 tablet by mouth daily as needed for constipation       tolterodine ER (DETROL LA) 4 MG 24 hr capsule Take 4 mg by mouth daily       vitamin B-12 (CYANOCOBALAMIN) 1000 MCG tablet Take 1,000 mcg by mouth daily       vitamin B-Complex Take 1 tablet by mouth daily       Vitamin D3 (CHOLECALCIFEROL) 25 mcg (1000 units) tablet Take 1 tablet by mouth daily       Zinc Oxide 12 % CREA Externally apply topically 2 times daily         ROS:  Limited secondary to cognitive impairment but today pt reports as above in HPI    Exam:  /84   Pulse 68   Temp 97.1  F (36.2  C)   Resp 14   Wt 105 kg (231 lb 6.4 oz)   SpO2 91%   BMI 33.20 kg/m    Alert, pleasant, nicely groomed  Able to self transfer from one chair to another  Slightly dry mouth  Heart tones irregularly irregular rate controlled  Lungs clear  No edema  Obese abdomen  Cheerful disposition    Lab/Diagnostic data:  4/11/23 (today): Na 142, K 4.5, BUN 26, Cr 1.18 (hospital range Cr 1.0 - 1.5)  Hospital CBC: WBC 5.8, Hgb 13.6, Platelets 167, BNP 2,136  July 2022: TSH within normal limits 1.37, HgbA1c 6.5%    ASSESSMENT/PLAN:  Personal history of urinary tract infection  Urinary frequency  She ok'd discontinuation of Detrol LA as is not effective as still has urinary frequency anyway at baseline and it does have potential for s/e such as dry mouth and worsening cognitive impairment  She remains on Myrbetriq  She had no complaints today in regards to overall urinary habits and has completed  her antibiotic course      Acute on chronic systolic heart failure (H)  Stage 3 chronic kidney disease, unspecified whether stage 3a or 3b CKD (H)  Chronic atrial fibrillation (H)  Seems euvolemic today on RA with stable vitals compared to hospitalization where she was hypoxic and needed IV diuresis  Weight stable  Continues on Eliquis for afib, Lipitor, Lasix 20 mg daily  BMP today stable as noted above      Dementia, unspecified dementia severity, unspecified dementia type, unspecified whether behavioral, psychotic, or mood disturbance or anxiety (H)  Recurrent major depressive disorder, remission status unspecified (H)  Physical deconditioning  Continue physical therapy and occupational therapy to help with safe dispo  SLUMS initially 3/30 but with retest up to 12/30  Planing discharge back to previous halfway  Monitor mood on current meds; seems cheerful today though does follow with PCP on depression; Abilify is a newer medication to her regimen; also on Cymbalta  On Gabapentin as well for some chronic pain  Monitor for delirium  Discontinuing Detrol LA as noted above will also potentially help cognition        Electronically signed by:  Gisela Liao DO

## 2023-04-11 NOTE — LETTER
"    4/11/2023        RE: Carmelina Gaspar  [address]  [facility] [address]        [place] GERIATRIC SERVICES  PHYSICIAN NOTE    PRIMARY CARE PROVIDER AND CLINIC:  Deidra Dick, NP, [address] [place] [address] / [facility] [address]    Chief Complaint   Patient presents with     Hospital F/U     [place] Medical Record Number:  [id number]  Place of Service where encounter took place:  [facility] (TCU) [[id number]]    Carmelina Gaspra is a 82 year old (1940), admitted to the above facility from  [facility] . Hospital stay 3/25/23 through 3/30/23. Admitted to this facility for  rehab, medical management and nursing care.     HPI:    HPI information obtained from: facility chart records, facility staff, patient report, [facility] chart review and Care Everywhere [facility] chart review.     Brief summary of hospital course: Carmelina Gaspar is an 82yoF with h/o depression, chronic pain, dementia, afib on anticoagulation, CHF and urinary incontinence admitted from her [facility] with dysuria and dyspnea found to have UTI treated with antibiotics and acute on chronic CHF with slight fluid overload s/p diuresis. Ultimately discharged with remaining Cefuroxime antibiotics and back to home dosing Lasix. Discharged to TCU for rehab/cares.    Updates on status since skilled nursing admission: Carmelina is seen in her room today and welcomes the visit. Says she is \"good\" and denies dyspnea at rest but admits to some dyspnea on exertion. Breathing comfortably on RA. No cough. No current UTI symptoms but mentions chronic urinary frequency that seems unchanged since she had started her Detrol and/or Myrbetriq. Willing to go off of Detrol given comorbid dementia and h/o dry mouth. Says she gets medication assistance at her [facility]. After our visit she asks about going out to sit in common area to be around others so I do help her get out there though she was able to self transfer. No acute nursing concerns.    CODE " STATUS/ADVANCE DIRECTIVES DISCUSSION:  DNR  Patient's living condition: lives in an assisted living facility    ALLERGIES: Gabapentin, Levofloxacin, Etodolac, and Oxycodone    Past Medical History:   Diagnosis Date     Atrial fibrillation (H)      CAD (coronary artery disease)      Central retinal vein occlusion of right eye      CKD (chronic kidney disease) stage 3, GFR 30-59 ml/min (H)      Dementia (H)      Depression      Hypothyroidism      Ischemic cardiomyopathy      Urinary incontinence       Past Surgical History:   Procedure Laterality Date     CHOLECYSTECTOMY       OTHER SURGICAL HISTORY  03/2017    Endovascular stent (Dr. Devine) for infrarenal AAA     REPLACEMENT TOTAL KNEE Bilateral        Current Outpatient Medications   Medication Sig Dispense Refill     acetaminophen (ARTHRITIS PAIN APAP) 650 MG CR tablet Take 1,300 mg by mouth 3 times daily       albuterol (PROAIR HFA/PROVENTIL HFA/VENTOLIN HFA) 108 (90 Base) MCG/ACT inhaler Inhale 2 puffs into the lungs every 4 hours as needed for shortness of breath, wheezing or cough       apixaban ANTICOAGULANT (ELIQUIS) 5 MG tablet Take 5 mg by mouth 2 times daily       ARIPiprazole (ABILIFY) 2 MG tablet Take 2 mg by mouth daily       atorvastatin (LIPITOR) 20 MG tablet Take 20 mg by mouth daily       carboxymethylcellulose PF (REFRESH PLUS) 0.5 % ophthalmic solution Place 1 drop into both eyes 4 times daily       clotrimazole (LOTRIMIN) 1 % external cream Apply topically 2 times daily       DULoxetine (CYMBALTA) 30 MG capsule Take 30 mg by mouth daily AND  60 mg, oral, Once A Day, Take with 30mg capsule to equal 90mg daily       FUROSEMIDE PO Take 20 mg by mouth daily.       gabapentin (NEURONTIN) 100 MG capsule Take 100 mg by mouth every evening       gabapentin (NEURONTIN) 100 MG capsule 100 mg, oral, Once A Day, Take with 300mg capsule at 2pm to equal 400mg total       gabapentin (NEURONTIN) 300 MG capsule Take 300 mg by mouth daily 0800       gabapentin  (NEURONTIN) 300 MG capsule 300 mg, oral, Once A Day, Take with 100mg capsule to equal 400mg total at 2pm       isosorbide mononitrate (IMDUR) 60 MG 24 hr tablet Take 60 mg by mouth daily       LEVOTHYROXINE SODIUM PO Take 137 mcg by mouth daily       metoprolol succinate ER (TOPROL XL) 100 MG 24 hr tablet Take 100 mg by mouth daily       mirabegron (MYRBETRIQ) 25 MG 24 hr tablet Take 25 mg by mouth daily       OMEPRAZOLE PO Take 20 mg by mouth daily       potassium chloride ER (KLOR-CON M) 10 MEQ CR tablet Take 10 mEq by mouth daily       sennosides (SENOKOT) 8.6 MG tablet Take 1 tablet by mouth daily as needed for constipation       tolterodine ER (DETROL LA) 4 MG 24 hr capsule Take 4 mg by mouth daily       vitamin B-12 (CYANOCOBALAMIN) 1000 MCG tablet Take 1,000 mcg by mouth daily       vitamin B-Complex Take 1 tablet by mouth daily       Vitamin D3 (CHOLECALCIFEROL) 25 mcg (1000 units) tablet Take 1 tablet by mouth daily       Zinc Oxide 12 % CREA Externally apply topically 2 times daily         ROS:  Limited secondary to cognitive impairment but today pt reports as above in HPI    Exam:  /84   Pulse 68   Temp 97.1  F (36.2  C)   Resp 14   Wt 105 kg (231 lb 6.4 oz)   SpO2 91%   BMI 33.20 kg/m    Alert, pleasant, nicely groomed  Able to self transfer from one chair to another  Slightly dry mouth  Heart tones irregularly irregular rate controlled  Lungs clear  No edema  Obese abdomen  Cheerful disposition    Lab/Diagnostic data:  4/11/23 (today): Na 142, K 4.5, BUN 26, Cr 1.18 (hospital range Cr 1.0 - 1.5)  Hospital CBC: WBC 5.8, Hgb 13.6, Platelets 167, BNP 2,136  July 2022: TSH within normal limits 1.37, HgbA1c 6.5%    ASSESSMENT/PLAN:  Personal history of urinary tract infection  Urinary frequency  She ok'd discontinuation of Detrol LA as is not effective as still has urinary frequency anyway at baseline and it does have potential for s/e such as dry mouth and worsening cognitive impairment  She  remains on Myrbetriq  She had no complaints today in regards to overall urinary habits and has completed her antibiotic course      Acute on chronic systolic heart failure (H)  Stage 3 chronic kidney disease, unspecified whether stage 3a or 3b CKD (H)  Chronic atrial fibrillation (H)  Seems euvolemic today on RA with stable vitals compared to hospitalization where she was hypoxic and needed IV diuresis  Weight stable  Continues on Eliquis for afib, Lipitor, Lasix 20 mg daily  BMP today stable as noted above      Dementia, unspecified dementia severity, unspecified dementia type, unspecified whether behavioral, psychotic, or mood disturbance or anxiety (H)  Recurrent major depressive disorder, remission status unspecified (H)  Physical deconditioning  Continue physical therapy and occupational therapy to help with safe dispo  SLUMS initially 3/30 but with retest up to 12/30  Planing discharge back to previous half-way  Monitor mood on current meds; seems cheerful today though does follow with PCP on depression; Abilify is a newer medication to her regimen; also on Cymbalta  On Gabapentin as well for some chronic pain  Monitor for delirium  Discontinuing Detrol LA as noted above will also potentially help cognition        Electronically signed by:  Gisela Liao DO        Sincerely,        Gisela Liao DO

## 2023-04-17 ENCOUNTER — DISCHARGE SUMMARY NURSING HOME (OUTPATIENT)
Dept: GERIATRICS | Facility: CLINIC | Age: 83
End: 2023-04-17
Payer: COMMERCIAL

## 2023-04-17 VITALS
TEMPERATURE: 97.3 F | SYSTOLIC BLOOD PRESSURE: 157 MMHG | HEART RATE: 74 BPM | DIASTOLIC BLOOD PRESSURE: 80 MMHG | OXYGEN SATURATION: 96 % | BODY MASS INDEX: 33.29 KG/M2 | RESPIRATION RATE: 16 BRPM | WEIGHT: 232 LBS

## 2023-04-17 DIAGNOSIS — I48.20 CHRONIC ATRIAL FIBRILLATION (H): ICD-10-CM

## 2023-04-17 DIAGNOSIS — I25.810 CORONARY ARTERY DISEASE INVOLVING AUTOLOGOUS ARTERY CORONARY BYPASS GRAFT WITHOUT ANGINA PECTORIS: ICD-10-CM

## 2023-04-17 DIAGNOSIS — F32.A DEPRESSION, UNSPECIFIED DEPRESSION TYPE: ICD-10-CM

## 2023-04-17 DIAGNOSIS — A49.8 KLEBSIELLA PNEUMONIAE INFECTION: Primary | ICD-10-CM

## 2023-04-17 DIAGNOSIS — N18.31 STAGE 3A CHRONIC KIDNEY DISEASE (H): ICD-10-CM

## 2023-04-17 DIAGNOSIS — R53.81 PHYSICAL DECONDITIONING: ICD-10-CM

## 2023-04-17 DIAGNOSIS — F03.90 DEMENTIA, UNSPECIFIED DEMENTIA SEVERITY, UNSPECIFIED DEMENTIA TYPE, UNSPECIFIED WHETHER BEHAVIORAL, PSYCHOTIC, OR MOOD DISTURBANCE OR ANXIETY (H): ICD-10-CM

## 2023-04-17 DIAGNOSIS — I50.20 HEART FAILURE WITH REDUCED EJECTION FRACTION, NYHA CLASS I (H): ICD-10-CM

## 2023-04-17 DIAGNOSIS — I10 BENIGN ESSENTIAL HYPERTENSION: ICD-10-CM

## 2023-04-17 PROCEDURE — 99316 NF DSCHRG MGMT 30 MIN+: CPT | Performed by: NURSE PRACTITIONER

## 2023-04-17 NOTE — LETTER
4/17/2023        RE: Carmelina Gaspar  [address]  [facility] [address]        [facility] GERIATRICS DISCHARGE SUMMARY  PATIENT'S NAME: Carmelina Gaspar  YOB: 1940  MEDICAL RECORD NUMBER:  [id number]  Place of Service where encounter took place:  [facility] [[id number]    PRIMARY CARE PROVIDER AND CLINIC RESPONSIBLE AFTER TRANSFER:   Deidra Dick NP, [address] [place] [address] / [facility] [address]    [facility] [place]     Transferring providers: Concepcion Lemon, VISH CONTI, Gisela Liao DO  Recent Hospitalization/ED:  Hospital  [facility]  stay 3/25/23 to 3/30/23.  Date of SNF Admission: 3/30/23  Date of SNF (anticipated) Discharge: 4/20/23  Discharged to: previous assisted living  Cognitive Scores: SLUMS Score Retest 12/30. Safety Questionnaire 7/12.  Physical Function: SBA needed with transfers. Modified independence for bed mobility. Ambulating 120 feet with FWW and SBA. Minimal assistance needed with lower body dressing and toileting.   DME: No new DME needed    CODE STATUS/ADVANCE DIRECTIVES DISCUSSION:  No CPR- Do NOT Intubate   ALLERGIES: Gabapentin, Levofloxacin, Etodolac, and Oxycodone    NURSING FACILITY COURSE   This is an 82-year-old female, with a past medical history significant for chronic atrial fibrillation, CVA, mixed Alzheimer's and vascular dementia, right vision loss, chronic kidney disease stage III, type 2 diabetes mellitus, hypothyroidism, and depression, who was admitted to [facility] 3/25/23 through 3/30/23 for dyspnea and dysuria. Antibiotics were initiated. A urine culture revealed > 100,000 CFU/ml Klebsiella pneumoniae. Became dyspneic on 3/26/23. BNP elevated. Received extra dose of IV Furosemide. A TCU stay was recommended for physical rehabilitation.    Recent Klebsiella Pneumoniae Urinary Tract Infection with Urinary Incontinence. Completed antibiotic treatment 3/30/23. PTA Tolterodine discontinued on  4/11/23 due to side effects. Continue Mirabegron as ordered.      Heart Failure with Reduced Ejection Fraction 35% on 1/27/22. Required IV diuresis during hospitalization due to dyspnea and elevated BNP. Upon review of weights during TCU stay, 234.4 lbs on 3/30/23 -> 234.6 lbs on 4/17/23. On Furosemide. Albuterol available PRN for shortness of breath.      Coronary Artery Disease S/P CABG, Hypertension and Hyperlipidemia with History of CVA. Followed by [facility]. Upon review of blood pressures over the past 5 days, systolic range from 112-168, most 110-130s. Diastolic range from 65-98.  Continue Atorvastatin, Isosorbide Mononitrate, and Metoprolol as ordered. Also on Furosemide.      Chronic Atrial Fibrillation. Upon review of heart rate over the past 5 days, 62-86. Continue Apixaban and Metoprolol as ordered.     Type 2 Diabetes Mellitus. Last A1C 6.5 on 7/20/22. Diet-controlled. Monitor blood sugars PRN is symptomatic.      Mixed Alzheimer's and Vascular Dementia. SLUMS Score 3/30 with retest 12/30 per OT testing. Will return to [facility].     Depression. Seen by PCP on 3/9/23 for worsening depression. PHQ-9 Score 11. Started on Aripiprazole. Continue Duloxetine as ordered.     Chronic Back Pain with Left Hip Osteoarthritis. Has received steroid injections in the left hip in the past. Continue Acetaminophen and Gabapentin as ordered.     Chronic Kidney Disease Stage III. Baseline Creatinine low 1s. Last Creatinine 1.18 on 4/11/23. Monitor periodically to ensure stability.      Hypothyroidism. Last TSH 1.37 on 7/20/22. Continue Levothyroxine as ordered.     Hypokalemia. Secondary to diuretic. Last Potassium 4.5 on 4/11/23. Continue Potassium Chloride as ordered.     Gastroesophageal Reflux Disease with History of Achalasia of Esophagus. Continue Omeprazole as ordered.     Constipation. Continue Senna as ordered.     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 Followed by [facility] at [facility]. Receives KERRI injection every 4 weeks. Also on Refresh Tears.     Physical Deconditioning. Secondary to recent hospitalization and co-morbidities. Home Physical and Occupational Therapy ordered.    Discharge Medications:  Current Outpatient Medications   Medication Sig Dispense Refill     acetaminophen (ARTHRITIS PAIN APAP) 650 MG CR tablet Take 1,300 mg by mouth 3 times daily       albuterol (PROAIR HFA/PROVENTIL HFA/VENTOLIN HFA) 108 (90 Base) MCG/ACT inhaler Inhale 2 puffs into the lungs every 4 hours as needed for shortness of breath, wheezing or cough       apixaban ANTICOAGULANT (ELIQUIS) 5 MG tablet Take 5 mg by mouth 2 times daily       ARIPiprazole (ABILIFY) 2 MG tablet Take 2 mg by mouth daily       atorvastatin (LIPITOR) 20 MG tablet Take 20 mg by mouth daily       carboxymethylcellulose PF (REFRESH PLUS) 0.5 % ophthalmic solution Place 1 drop into both eyes 4 times daily       clotrimazole (LOTRIMIN) 1 % external cream Apply topically 2 times daily       DULoxetine (CYMBALTA) 30 MG capsule Take 30 mg by mouth daily AND  60 mg, oral, Once A Day, Take with 30mg capsule to equal 90mg daily       FUROSEMIDE PO Take 20 mg by mouth daily.       gabapentin (NEURONTIN) 100 MG capsule Take 100 mg by mouth every evening       gabapentin (NEURONTIN) 100 MG capsule 100 mg, oral, Once A Day, Take with 300mg capsule at 2pm to equal 400mg total       gabapentin (NEURONTIN) 300 MG capsule Take 300 mg by mouth daily 0800       gabapentin (NEURONTIN) 300 MG capsule 300 mg, oral, Once A Day, Take with 100mg capsule to equal 400mg total at 2pm       isosorbide mononitrate (IMDUR) 60 MG 24 hr tablet Take 60 mg by mouth daily       LEVOTHYROXINE SODIUM PO Take 137 mcg by mouth daily       metoprolol succinate ER (TOPROL XL) 100 MG 24 hr tablet Take 100 mg by mouth daily       mirabegron (MYRBETRIQ) 25 MG 24 hr tablet Take 25 mg by mouth daily       OMEPRAZOLE PO Take 20 mg by  mouth daily       potassium chloride ER (KLOR-CON M) 10 MEQ CR tablet Take 10 mEq by mouth daily       sennosides (SENOKOT) 8.6 MG tablet Take 1 tablet by mouth daily as needed for constipation       vitamin B-12 (CYANOCOBALAMIN) 1000 MCG tablet Take 1,000 mcg by mouth daily       vitamin B-Complex Take 1 tablet by mouth daily       Vitamin D3 (CHOLECALCIFEROL) 25 mcg (1000 units) tablet Take 1 tablet by mouth daily       Zinc Oxide 12 % CREA Externally apply topically 2 times daily        Controlled medications:   not applicable/none     Past Medical History:   Past Medical History:   Diagnosis Date     Atrial fibrillation (H)      Physical Exam:   Vitals: BP (!) 157/80   Pulse 74   Temp 97.3  F (36.3  C)   Resp 16   Wt 105.2 kg (232 lb)   SpO2 96%   BMI 33.29 kg/m    BMI: Body mass index is 33.29 kg/m .  GENERAL APPEARANCE:  Alert, in no distress  ENT:  Mouth and posterior oropharynx normal, moist mucous membranes  EYES:  EOM, conjunctivae, lids, pupils and irises normal  RESP:  respiratory effort and palpation of chest normal, lungs clear to auscultation , no respiratory distress, in anterior lobes  CV:  Palpation and auscultation of heart done , regular rate and rhythm, no murmur, rub, or gallop  ABDOMEN:  normal bowel sounds, soft, nontender, no hepatosplenomegaly or other masses  M/S:   No edema in BLE  SKIN:  Inspection of skin and subcutaneous tissue baseline, Palpation of skin and subcutaneous tissue baseline  NEURO:   Cranial nerves 2-12 are normal tested and grossly at patient's baseline  PSYCH:  oriented to person     SNF labs: Labs done in SNF are in [place] [facility]. Please refer to them using [facility] Everywhere.    DISCHARGE PLAN:    Follow up labs: No labs orders/due    Medical Follow Up:      Follow up with primary care provider in 1 week      Discharge Services: Home Care:  Occupational Therapy, Physical Therapy, Registered Nurse and Home Health Aide    TOTAL DISCHARGE TIME:   Greater than 30  minutes  Electronically signed by:  VISH Hutchinson CNP         Documentation of Face-to-Face and Certification for Home Health Services     Patient: Carmelina Gaspar   YOB: 1940  MR Number: [id number]  Today's Date: 4/17/2023    I certify that patient: Carmelina Gaspar is under my care and that I, or a nurse practitioner or physician's assistant working with me, had a face-to-face encounter that meets the physician face-to-face encounter requirements with this patient on: 4/17/23.    This encounter with the patient was in whole, or in part, for the following medical condition, which is the primary reason for home health care: Klebsiella Pneumoniae Urinary Tract Infection with Urinary Incontinence.    I certify that, based on my findings, the following services are medically necessary home health services: Nursing, Occupational Therapy and Physical Therapy.    My clinical findings support the need for the above services because: Nurse is needed: To assess blood pressures, heart rate after changes in medications or other medical regimen.., Occupational Therapy Services are needed to assess and treat cognitive ability and address ADL safety due to impairment in Recent hosptialization for Klebsiella Pneumoniae Urinary Tract Infection with Urinary Incontinence.. and Physical Therapy Services are needed to assess and treat the following functional impairments: Recent hospitalization for Klebsiella Pneumoniae Urinary Tract Infection with Urinary Incontinence..    Further, I certify that my clinical findings support that this patient is homebound (i.e. absences from home require considerable and taxing effort and are for medical reasons or [facility] services or infrequently or of short duration when for other reasons) because: Requires assistance of another person or specialized equipment to access medical services because patient: Is prone to wander/get lost without assistance...    Based on the above  findings. I certify that this patient is confined to the home and needs intermittent skilled nursing care, physical therapy and/or speech therapy.  The patient is under my care, and I have initiated the establishment of the plan of care.  This patient will be followed by a physician who will periodically review the plan of care.  Physician/Provider to provide follow up care: Deidra Dick    Responsible Medicare certified PECOS Physician: Dr. Gisela Liao  Physician Signature: See electronic signature associated with these discharge orders.  Date: 4/17/2023                  Sincerely,        VISH Hutchinson CNP

## 2023-04-17 NOTE — PROGRESS NOTES
[facility] GERIATRICS DISCHARGE SUMMARY  PATIENT'S NAME: Carmelina Gaspar  YOB: 1940  MEDICAL RECORD NUMBER:  [id number]  Place of Service where encounter took place:  [facility] (TCU) [[id number]]    PRIMARY CARE PROVIDER AND CLINIC RESPONSIBLE AFTER TRANSFER:   Deidra Dick NP, [address] [address]    [facility] [place]     Transferring providers: Concepcion Lemon, VISH CONTI, Gisela Liao DO  Recent Hospitalization/ED:  Hospital  [facility]  stay 3/25/23 to 3/30/23.  Date of SNF Admission: 3/30/23  Date of SNF (anticipated) Discharge: 4/20/23  Discharged to: previous assisted living  Cognitive Scores: SLUMS Score Retest 12/30. Safety Questionnaire 7/12.  Physical Function: SBA needed with transfers. Modified independence for bed mobility. Ambulating 120 feet with FWW and SBA. Minimal assistance needed with lower body dressing and toileting.   DME: No new DME needed    CODE STATUS/ADVANCE DIRECTIVES DISCUSSION:  No CPR- Do NOT Intubate   ALLERGIES: Gabapentin, Levofloxacin, Etodolac, and Oxycodone    NURSING FACILITY COURSE   This is an 82-year-old female, with a past medical history significant for chronic atrial fibrillation, CVA, mixed Alzheimer's and vascular dementia, right vision loss, chronic kidney disease stage III, type 2 diabetes mellitus, hypothyroidism, and depression, who was admitted to [facility] 3/25/23 through 3/30/23 for dyspnea and dysuria. Antibiotics were initiated. A urine culture revealed > 100,000 CFU/ml Klebsiella pneumoniae. Became dyspneic on 3/26/23. BNP elevated. Received extra dose of IV Furosemide. A TCU stay was recommended for physical rehabilitation.    Recent Klebsiella Pneumoniae Urinary Tract Infection with Urinary Incontinence. Completed antibiotic treatment 3/30/23. PTA Tolterodine discontinued on 4/11/23 due to side effects. Continue Mirabegron as ordered.      Heart Failure with Reduced  Ejection Fraction 35% on 1/27/22. Required IV diuresis during hospitalization due to dyspnea and elevated BNP. Upon review of weights during TCU stay, 234.4 lbs on 3/30/23 -> 234.6 lbs on 4/17/23. On Furosemide. Albuterol available PRN for shortness of breath.      Coronary Artery Disease S/P CABG, Hypertension and Hyperlipidemia with History of CVA. Followed by [facility]. Upon review of blood pressures over the past 5 days, systolic range from 112-168, most 110-130s. Diastolic range from 65-98.  Continue Atorvastatin, Isosorbide Mononitrate, and Metoprolol as ordered. Also on Furosemide.      Chronic Atrial Fibrillation. Upon review of heart rate over the past 5 days, 62-86. Continue Apixaban and Metoprolol as ordered.     Type 2 Diabetes Mellitus. Last A1C 6.5 on 7/20/22. Diet-controlled. Monitor blood sugars PRN is symptomatic.      Mixed Alzheimer's and Vascular Dementia. SLUMS Score 3/30 with retest 12/30 per OT testing. Will return to [facility].     Depression. Seen by PCP on 3/9/23 for worsening depression. PHQ-9 Score 11. Started on Aripiprazole. Continue Duloxetine as ordered.     Chronic Back Pain with Left Hip Osteoarthritis. Has received steroid injections in the left hip in the past. Continue Acetaminophen and Gabapentin as ordered.     Chronic Kidney Disease Stage III. Baseline Creatinine low 1s. Last Creatinine 1.18 on 4/11/23. Monitor periodically to ensure stability.      Hypothyroidism. Last TSH 1.37 on 7/20/22. Continue Levothyroxine as ordered.     Hypokalemia. Secondary to diuretic. Last Potassium 4.5 on 4/11/23. Continue Potassium Chloride as ordered.     Gastroesophageal Reflux Disease with History of Achalasia of Esophagus. Continue Omeprazole as ordered.     Constipation. Continue Senna as ordered.     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 Followed by [facility] at [facility]. Receives KERRI injection every 4 weeks. Also  on Refresh Tears.     Physical Deconditioning. Secondary to recent hospitalization and co-morbidities. Home Physical and Occupational Therapy ordered.    Discharge Medications:  Current Outpatient Medications   Medication Sig Dispense Refill     acetaminophen (ARTHRITIS PAIN APAP) 650 MG CR tablet Take 1,300 mg by mouth 3 times daily       albuterol (PROAIR HFA/PROVENTIL HFA/VENTOLIN HFA) 108 (90 Base) MCG/ACT inhaler Inhale 2 puffs into the lungs every 4 hours as needed for shortness of breath, wheezing or cough       apixaban ANTICOAGULANT (ELIQUIS) 5 MG tablet Take 5 mg by mouth 2 times daily       ARIPiprazole (ABILIFY) 2 MG tablet Take 2 mg by mouth daily       atorvastatin (LIPITOR) 20 MG tablet Take 20 mg by mouth daily       carboxymethylcellulose PF (REFRESH PLUS) 0.5 % ophthalmic solution Place 1 drop into both eyes 4 times daily       clotrimazole (LOTRIMIN) 1 % external cream Apply topically 2 times daily       DULoxetine (CYMBALTA) 30 MG capsule Take 30 mg by mouth daily AND  60 mg, oral, Once A Day, Take with 30mg capsule to equal 90mg daily       FUROSEMIDE PO Take 20 mg by mouth daily.       gabapentin (NEURONTIN) 100 MG capsule Take 100 mg by mouth every evening       gabapentin (NEURONTIN) 100 MG capsule 100 mg, oral, Once A Day, Take with 300mg capsule at 2pm to equal 400mg total       gabapentin (NEURONTIN) 300 MG capsule Take 300 mg by mouth daily 0800       gabapentin (NEURONTIN) 300 MG capsule 300 mg, oral, Once A Day, Take with 100mg capsule to equal 400mg total at 2pm       isosorbide mononitrate (IMDUR) 60 MG 24 hr tablet Take 60 mg by mouth daily       LEVOTHYROXINE SODIUM PO Take 137 mcg by mouth daily       metoprolol succinate ER (TOPROL XL) 100 MG 24 hr tablet Take 100 mg by mouth daily       mirabegron (MYRBETRIQ) 25 MG 24 hr tablet Take 25 mg by mouth daily       OMEPRAZOLE PO Take 20 mg by mouth daily       potassium chloride ER (KLOR-CON M) 10 MEQ CR tablet Take 10 mEq by mouth  daily       sennosides (SENOKOT) 8.6 MG tablet Take 1 tablet by mouth daily as needed for constipation       vitamin B-12 (CYANOCOBALAMIN) 1000 MCG tablet Take 1,000 mcg by mouth daily       vitamin B-Complex Take 1 tablet by mouth daily       Vitamin D3 (CHOLECALCIFEROL) 25 mcg (1000 units) tablet Take 1 tablet by mouth daily       Zinc Oxide 12 % CREA Externally apply topically 2 times daily        Controlled medications:   not applicable/none     Past Medical History:   Past Medical History:   Diagnosis Date     Atrial fibrillation (H)      Physical Exam:   Vitals: BP (!) 157/80   Pulse 74   Temp 97.3  F (36.3  C)   Resp 16   Wt 105.2 kg (232 lb)   SpO2 96%   BMI 33.29 kg/m    BMI: Body mass index is 33.29 kg/m .  GENERAL APPEARANCE:  Alert, in no distress  ENT:  Mouth and posterior oropharynx normal, moist mucous membranes  EYES:  EOM, conjunctivae, lids, pupils and irises normal  RESP:  respiratory effort and palpation of chest normal, lungs clear to auscultation , no respiratory distress, in anterior lobes  CV:  Palpation and auscultation of heart done , regular rate and rhythm, no murmur, rub, or gallop  ABDOMEN:  normal bowel sounds, soft, nontender, no hepatosplenomegaly or other masses  M/S:   No edema in BLE  SKIN:  Inspection of skin and subcutaneous tissue baseline, Palpation of skin and subcutaneous tissue baseline  NEURO:   Cranial nerves 2-12 are normal tested and grossly at patient's baseline  PSYCH:  oriented to person     SNF labs: Labs done in SNF are in [facility]. Please refer to them using [facility] Everywhere.    DISCHARGE PLAN:    Follow up labs: No labs orders/due    Medical Follow Up:      Follow up with primary care provider in 1 week      Discharge Services: Home Care:  Occupational Therapy, Physical Therapy, Registered Nurse and Home Health Aide    TOTAL DISCHARGE TIME:   Greater than 30 minutes  Electronically signed by:  VISH Hutchinson CNP         Documentation  of Face-to-Face and Certification for Home Health Services     Patient: Carmelina Gaspar   YOB: 1940  MR Number: [id number]  Today's Date: 4/17/2023    I certify that patient: Carmelina Gaspar is under my care and that I, or a nurse practitioner or physician's assistant working with me, had a face-to-face encounter that meets the physician face-to-face encounter requirements with this patient on: 4/17/23.    This encounter with the patient was in whole, or in part, for the following medical condition, which is the primary reason for home health care: Klebsiella Pneumoniae Urinary Tract Infection with Urinary Incontinence.    I certify that, based on my findings, the following services are medically necessary home health services: Nursing, Occupational Therapy and Physical Therapy.    My clinical findings support the need for the above services because: Nurse is needed: To assess blood pressures, heart rate after changes in medications or other medical regimen.., Occupational Therapy Services are needed to assess and treat cognitive ability and address ADL safety due to impairment in Recent hosptialization for Klebsiella Pneumoniae Urinary Tract Infection with Urinary Incontinence.. and Physical Therapy Services are needed to assess and treat the following functional impairments: Recent hospitalization for Klebsiella Pneumoniae Urinary Tract Infection with Urinary Incontinence..    Further, I certify that my clinical findings support that this patient is homebound (i.e. absences from home require considerable and taxing effort and are for medical reasons or Buddhist services or infrequently or of short duration when for other reasons) because: Requires assistance of another person or specialized equipment to access medical services because patient: Is prone to wander/get lost without assistance...    Based on the above findings. I certify that this patient is confined to the home and needs intermittent  skilled nursing care, physical therapy and/or speech therapy.  The patient is under my care, and I have initiated the establishment of the plan of care.  This patient will be followed by a physician who will periodically review the plan of care.  Physician/Provider to provide follow up care: Deidra Dick    Responsible Medicare certified PECOS Physician: Dr. Gisela Liao  Physician Signature: See electronic signature associated with these discharge orders.  Date: 4/17/2023

## 2023-06-21 PROBLEM — F33.9 RECURRENT MAJOR DEPRESSIVE DISORDER, REMISSION STATUS UNSPECIFIED (H): Status: ACTIVE | Noted: 2023-06-21

## 2023-06-21 PROBLEM — I48.20 CHRONIC ATRIAL FIBRILLATION (H): Status: ACTIVE | Noted: 2023-06-21

## 2023-06-21 PROBLEM — I50.23 ACUTE ON CHRONIC SYSTOLIC HEART FAILURE (H): Status: ACTIVE | Noted: 2023-06-21

## 2023-06-21 PROBLEM — N18.30 STAGE 3 CHRONIC KIDNEY DISEASE, UNSPECIFIED WHETHER STAGE 3A OR 3B CKD (H): Status: ACTIVE | Noted: 2023-06-21

## 2023-06-21 PROBLEM — F03.90 DEMENTIA, UNSPECIFIED DEMENTIA SEVERITY, UNSPECIFIED DEMENTIA TYPE, UNSPECIFIED WHETHER BEHAVIORAL, PSYCHOTIC, OR MOOD DISTURBANCE OR ANXIETY (H): Status: ACTIVE | Noted: 2023-06-21

## 2024-11-12 ENCOUNTER — LAB REQUISITION (OUTPATIENT)
Dept: LAB | Facility: CLINIC | Age: 84
End: 2024-11-12
Payer: COMMERCIAL

## 2024-11-12 DIAGNOSIS — Z11.1 ENCOUNTER FOR SCREENING FOR RESPIRATORY TUBERCULOSIS: ICD-10-CM

## 2024-11-13 ENCOUNTER — TRANSITIONAL CARE UNIT VISIT (OUTPATIENT)
Dept: GERIATRICS | Facility: CLINIC | Age: 84
End: 2024-11-13
Payer: COMMERCIAL

## 2024-11-13 ENCOUNTER — DOCUMENTATION ONLY (OUTPATIENT)
Dept: GERIATRICS | Facility: CLINIC | Age: 84
End: 2024-11-13
Payer: COMMERCIAL

## 2024-11-13 VITALS
RESPIRATION RATE: 17 BRPM | HEIGHT: 66 IN | SYSTOLIC BLOOD PRESSURE: 119 MMHG | DIASTOLIC BLOOD PRESSURE: 72 MMHG | BODY MASS INDEX: 36.53 KG/M2 | HEART RATE: 72 BPM | TEMPERATURE: 98 F | WEIGHT: 227.3 LBS | OXYGEN SATURATION: 91 %

## 2024-11-13 DIAGNOSIS — R53.81 PHYSICAL DECONDITIONING: ICD-10-CM

## 2024-11-13 DIAGNOSIS — G89.29 OTHER CHRONIC PAIN: ICD-10-CM

## 2024-11-13 DIAGNOSIS — I10 BENIGN ESSENTIAL HYPERTENSION: ICD-10-CM

## 2024-11-13 DIAGNOSIS — K22.0 ACHALASIA: ICD-10-CM

## 2024-11-13 DIAGNOSIS — I48.20 CHRONIC ATRIAL FIBRILLATION (H): ICD-10-CM

## 2024-11-13 DIAGNOSIS — F03.90 DEMENTIA, UNSPECIFIED DEMENTIA SEVERITY, UNSPECIFIED DEMENTIA TYPE, UNSPECIFIED WHETHER BEHAVIORAL, PSYCHOTIC, OR MOOD DISTURBANCE OR ANXIETY (H): ICD-10-CM

## 2024-11-13 DIAGNOSIS — I50.42 CHRONIC COMBINED SYSTOLIC AND DIASTOLIC CONGESTIVE HEART FAILURE (H): ICD-10-CM

## 2024-11-13 DIAGNOSIS — J69.0 ASPIRATION PNEUMONIA, UNSPECIFIED ASPIRATION PNEUMONIA TYPE, UNSPECIFIED LATERALITY, UNSPECIFIED PART OF LUNG (H): Primary | ICD-10-CM

## 2024-11-13 PROCEDURE — 86481 TB AG RESPONSE T-CELL SUSP: CPT | Performed by: NURSE PRACTITIONER

## 2024-11-13 PROCEDURE — 36415 COLL VENOUS BLD VENIPUNCTURE: CPT | Performed by: NURSE PRACTITIONER

## 2024-11-13 PROCEDURE — P9604 ONE-WAY ALLOW PRORATED TRIP: HCPCS | Performed by: NURSE PRACTITIONER

## 2024-11-13 RX ORDER — LIDOCAINE 4 G/G
1 PATCH TOPICAL EVERY 24 HOURS
COMMUNITY

## 2024-11-13 RX ORDER — CARBOXYMETHYLCELLULOSE SODIUM 5 MG/ML
1 SOLUTION/ DROPS OPHTHALMIC 2 TIMES DAILY
COMMUNITY

## 2024-11-13 RX ORDER — FUROSEMIDE 20 MG/1
20 TABLET ORAL DAILY
COMMUNITY

## 2024-11-13 RX ORDER — MIRABEGRON 25 MG/1
25 TABLET, FILM COATED, EXTENDED RELEASE ORAL DAILY
COMMUNITY

## 2024-11-13 RX ORDER — DULOXETIN HYDROCHLORIDE 60 MG/1
60 CAPSULE, DELAYED RELEASE ORAL DAILY
COMMUNITY

## 2024-11-13 RX ORDER — LOPERAMIDE HYDROCHLORIDE 2 MG/1
2 CAPSULE ORAL 2 TIMES DAILY PRN
COMMUNITY

## 2024-11-13 RX ORDER — ACETAMINOPHEN 325 MG/1
650 TABLET ORAL 3 TIMES DAILY
COMMUNITY

## 2024-11-13 RX ORDER — LEVOTHYROXINE SODIUM 137 UG/1
137 TABLET ORAL DAILY
COMMUNITY

## 2024-11-13 RX ORDER — POTASSIUM CHLORIDE 1500 MG/1
10 TABLET, EXTENDED RELEASE ORAL DAILY
COMMUNITY

## 2024-11-13 RX ORDER — CLOTRIMAZOLE 1 %
CREAM (GRAM) TOPICAL 2 TIMES DAILY
COMMUNITY

## 2024-11-13 RX ORDER — METOPROLOL SUCCINATE 100 MG/1
100 TABLET, EXTENDED RELEASE ORAL DAILY
COMMUNITY

## 2024-11-13 RX ORDER — VITAMIN B COMPLEX
25 TABLET ORAL DAILY
COMMUNITY

## 2024-11-13 NOTE — LETTER
11/13/2024      Carmelina Gaspar  [address]  [facility] [address]        [facility] GERIATRICS    PRIMARY CARE PROVIDER AND CLINIC:  Deidra Dick NP, [address] / [facility] [address]  Chief Complaint   Patient presents with     Hospital F/U      [facility] Medical Record Number:  [id number]  Place of Service where encounter took place:  [facility] ([facility]) [[id number]]    Carmelina Gaspar  is a 84 year old  (1940) female with a medical history of achlasia with dilation, ischemic cardiomyopathy, atrial fibrillation, chronic low back pain, and type 2 diabetes.,  She currently lives at an assisted living facility where staff noted worsening mentation as well as a gurgling sound.  She was brought to [facility] where there were concerns for aspiration pneumonia.  She was intubated through 11/5/2024, treated with ceftriaxone. Had an EGD for Botox injection on 11 7 and following that had significant delirium which resolved by discharge.  Palliative care was involved throughout hospitalization given the progressing dementia.  Unfortunately did not meet the hospice requirement but should she show further signs of decline, interested in pursuing hospice.  admitted to the above facility from  [facility]. Hospital stay 11/2/2024 through 11/12/2024..     Carmelina was visited today while in her room resting in bed.  She states that she previously lived out of state but moved to [place] to be closer to her son.  She cannot recall the location of where she lives.  She denies pain or discomfort.  She does not feel short of breath.  States that her appetite has been really good (ate 100% of the breakfast food today).  She did not experience any coughing or difficulty chewing or swallowing.     CODE STATUS/ADVANCE DIRECTIVES DISCUSSION:  No CPR- Do NOT Intubate  DNR/DNI  ALLERGIES:   Allergies   Allergen Reactions     Gabapentin      Levofloxacin      Etodolac      Oxycodone       PAST MEDICAL HISTORY:    Past Medical History:   Diagnosis Date     Atrial fibrillation (H)      CAD (coronary artery disease)      Central retinal vein occlusion of right eye (H)      CKD (chronic kidney disease) stage 3, GFR 30-59 ml/min (H)      Dementia (H)      Depression      Hypothyroidism      Ischemic cardiomyopathy      Urinary incontinence       PAST SURGICAL HISTORY:   has a past surgical history that includes other surgical history (03/2017); Cholecystectomy; and Replacement Total Knee (Bilateral).  FAMILY HISTORY: family history is not on file.  SOCIAL HISTORY:   reports that she has quit smoking. She does not have any smokeless tobacco history on file. She reports that she does not drink alcohol and does not use drugs.  Patient's living condition: lives in an assisted living facility    Post Discharge Medication Reconciliation Status:   MED REC REQUIRED  Post Medication Reconciliation Status: discharge medications reconciled, continue medications without change       Current Outpatient Medications   Medication Sig Dispense Refill     acetaminophen (TYLENOL) 325 MG tablet Take 650 mg by mouth 3 times daily.       albuterol (PROAIR HFA/PROVENTIL HFA/VENTOLIN HFA) 108 (90 Base) MCG/ACT inhaler Inhale 2 puffs into the lungs every 4 hours as needed for shortness of breath, wheezing or cough       apixaban ANTICOAGULANT (ELIQUIS) 5 MG tablet Take 5 mg by mouth 2 times daily       atorvastatin (LIPITOR) 20 MG tablet Take 20 mg by mouth At Bedtime       carboxymethylcellulose PF (REFRESH PLUS) 0.5 % ophthalmic solution Place 1 drop into both eyes 2 times daily.       clotrimazole (LOTRIMIN) 1 % external cream Apply topically 2 times daily.       DULoxetine (CYMBALTA) 60 MG capsule Take 60 mg by mouth daily.       furosemide (LASIX) 20 MG tablet Take 20 mg by mouth daily.       levothyroxine (SYNTHROID/LEVOTHROID) 137 MCG tablet Take 137 mcg by mouth daily.       Lidocaine (LIDOCARE) 4 % Patch Place 1 patch onto the skin every 24  "hours. To prevent lidocaine toxicity, patient should be patch free for 12 hrs daily.       loperamide (IMODIUM) 2 MG capsule Take 2 mg by mouth 2 times daily as needed for diarrhea.       metoprolol succinate ER (TOPROL XL) 100 MG 24 hr tablet Take 100 mg by mouth daily.       mirabegron (MYRBETRIQ) 25 MG 24 hr tablet Take 25 mg by mouth daily.       omeprazole (PRILOSEC) 20 MG DR capsule Take 20 mg by mouth daily.       potassium chloride ER (K-TAB) 20 MEQ CR tablet Take 10 mEq by mouth daily.       Skin Protectants, Misc. (HYDROCERIN EX) Externally apply topically daily as needed.       Vitamin D3 (CHOLECALCIFEROL) 25 mcg (1000 units) tablet Take 25 mcg by mouth daily.       White Petrolatum-Mineral Oil (SOOTHE NIGHTTIME OP) Apply to eye. Instill 1 application in both  eyes at bedtime for . Apply a thin line into both eyes at  bedtime       No current facility-administered medications for this visit.     ROS:  Limited secondary to cognitive impairment but today pt reports as noted above.    Vitals:  /72   Pulse 72   Temp 98  F (36.7  C)   Resp 17   Ht 1.676 m (5' 6\")   Wt 103.1 kg (227 lb 4.8 oz)   SpO2 91%   BMI 36.69 kg/m    Exam:  GENERAL APPEARANCE:  Alert, in no distress, pleasant, cooperative  EYES: no discharge or mattering on lids or lashes noted  ENT:  moist mucous membranes, hearing acuity intact  NECK: supple, symmetrical  RESP: no respiratory distress, Lung sounds clear, patient is on room air  CV:  rate and rhythm regular, no murmur. Edema trace in bilateral lower extremities. VASCULAR: warm extremities without open areas.  ABDOMEN: normal bowel sounds, soft, nontender.  M/S:   Gait and station: requiring assistance with all cares. , no tenderness or swelling of the joints; able to move all extremities   SKIN:  Inspection and palpation of skin and subcutaneous tissue: skin warm, dry and intact without rashes  NEURO: no facial asymmetry, no speech deficits and able to follow directions, " moves all extremities symmetrically  PSYCH:  insight, judgement, and memory impaired affect and mood normal    Lab/Diagnostic data:  Recent labs in [facility] reviewed by me today.     ASSESSMENT/PLAN:  Aspiration pneumonia  Secondary to achalasia.  Required intubation x 3 days. Treated with ceftriaxone.  No signs or symptoms of recurrent pneumonia  -- Monitor respiratory status    Dementia  Currently lives in an assisted living.  Had worsening delirium after EGD but cleared after gabapentin, melatonin, and Seroquel were held.  None were restarted at discharge.  She appears calm and pleasant today.  --continue with 24/7 nursing on secure unit. OT to administer cognitive testing.    Achalasia   s/p botilnum toxin (2/2019, 5/2021,11.2024)   EGD was completed 11/07/2024, which demonstrated mildly hypertonic LES, consistent with known history of achalasia which was injected with botulinum toxin.   - Continue with Omeprazole daily  - Repeat EGD PRN for re-treatment if symptoms recure.    CAD s/p CABG 2005  HFrEF (EF 35%)  HTN  Chronic a-fib  Hx TIA  ADALID Vas of 9 Points with 17% risk of stroke /TIA/ or embolus. -120s, euvolemic on exam.  --continue with metoprolol 100 mg daily, lasix 20 mg daily, potassium 10 mg daily, statin, eliquis 5 mg bid  - Continue to monitor blood pressure and heart rate, adjust medications as needed.    Chronic pain  No complaints of pain today.  Previously on gabapentin which was stopped due to altered mental status.  -- Continue with Tylenol arthritis 3 times a day  -- Cymbalta 60 mg daily.    Physical deconditioning  Secondary to recent hospitalization and underlying medical conditions.   --ongoing PT/OT for strengthening.     Electronically signed by:  VISH Ortega CNP                   Sincerely,        VISH Ortega CNP

## 2024-11-13 NOTE — PROGRESS NOTES
[facility] GERIATRICS    PRIMARY CARE PROVIDER AND CLINIC:  Deidra Dick NP, [address] / [facility] [address]  Chief Complaint   Patient presents with    Hospital F/U      [facility] Medical Record Number:  [id number]  Place of Service where encounter took place:  [facility] ([facility]) [[id number]]    Carmelina Gaspar  is a 84 year old  (1940) female with a medical history of achlasia with dilation, ischemic cardiomyopathy, atrial fibrillation, chronic low back pain, and type 2 diabetes.,  She currently lives at an assisted living facility where staff noted worsening mentation as well as a gurgling sound.  She was brought to [facility] where there were concerns for aspiration pneumonia.  She was intubated through 11/5/2024, treated with ceftriaxone. Had an EGD for Botox injection on 11 7 and following that had significant delirium which resolved by discharge.  Palliative care was involved throughout hospitalization given the progressing dementia.  Unfortunately did not meet the hospice requirement but should she show further signs of decline, interested in pursuing hospice.  admitted to the above facility from  [facility]. Hospital stay 11/2/2024 through 11/12/2024..     Carmelina was visited today while in her room resting in bed.  She states that she previously lived out of state but moved to [place] to be closer to her son.  She cannot recall the location of where she lives.  She denies pain or discomfort.  She does not feel short of breath.  States that her appetite has been really good (ate 100% of the breakfast food today).  She did not experience any coughing or difficulty chewing or swallowing.     CODE STATUS/ADVANCE DIRECTIVES DISCUSSION:  No CPR- Do NOT Intubate  DNR/DNI  ALLERGIES:   Allergies   Allergen Reactions    Gabapentin     Levofloxacin     Etodolac     Oxycodone       PAST MEDICAL HISTORY:   Past Medical History:   Diagnosis Date    Atrial fibrillation (H)     CAD (coronary artery  disease)     Central retinal vein occlusion of right eye (H)     CKD (chronic kidney disease) stage 3, GFR 30-59 ml/min (H)     Dementia (H)     Depression     Hypothyroidism     Ischemic cardiomyopathy     Urinary incontinence       PAST SURGICAL HISTORY:   has a past surgical history that includes other surgical history (03/2017); Cholecystectomy; and Replacement Total Knee (Bilateral).  FAMILY HISTORY: family history is not on file.  SOCIAL HISTORY:   reports that she has quit smoking. She does not have any smokeless tobacco history on file. She reports that she does not drink alcohol and does not use drugs.  Patient's living condition: lives in an assisted living facility    Post Discharge Medication Reconciliation Status:   MED REC REQUIRED  Post Medication Reconciliation Status: discharge medications reconciled, continue medications without change       Current Outpatient Medications   Medication Sig Dispense Refill    acetaminophen (TYLENOL) 325 MG tablet Take 650 mg by mouth 3 times daily.      albuterol (PROAIR HFA/PROVENTIL HFA/VENTOLIN HFA) 108 (90 Base) MCG/ACT inhaler Inhale 2 puffs into the lungs every 4 hours as needed for shortness of breath, wheezing or cough      apixaban ANTICOAGULANT (ELIQUIS) 5 MG tablet Take 5 mg by mouth 2 times daily      atorvastatin (LIPITOR) 20 MG tablet Take 20 mg by mouth At Bedtime      carboxymethylcellulose PF (REFRESH PLUS) 0.5 % ophthalmic solution Place 1 drop into both eyes 2 times daily.      clotrimazole (LOTRIMIN) 1 % external cream Apply topically 2 times daily.      DULoxetine (CYMBALTA) 60 MG capsule Take 60 mg by mouth daily.      furosemide (LASIX) 20 MG tablet Take 20 mg by mouth daily.      levothyroxine (SYNTHROID/LEVOTHROID) 137 MCG tablet Take 137 mcg by mouth daily.      Lidocaine (LIDOCARE) 4 % Patch Place 1 patch onto the skin every 24 hours. To prevent lidocaine toxicity, patient should be patch free for 12 hrs daily.      loperamide (IMODIUM) 2  "MG capsule Take 2 mg by mouth 2 times daily as needed for diarrhea.      metoprolol succinate ER (TOPROL XL) 100 MG 24 hr tablet Take 100 mg by mouth daily.      mirabegron (MYRBETRIQ) 25 MG 24 hr tablet Take 25 mg by mouth daily.      omeprazole (PRILOSEC) 20 MG DR capsule Take 20 mg by mouth daily.      potassium chloride ER (K-TAB) 20 MEQ CR tablet Take 10 mEq by mouth daily.      Skin Protectants, Misc. (HYDROCERIN EX) Externally apply topically daily as needed.      Vitamin D3 (CHOLECALCIFEROL) 25 mcg (1000 units) tablet Take 25 mcg by mouth daily.      White Petrolatum-Mineral Oil (SOOTHE NIGHTTIME OP) Apply to eye. Instill 1 application in both  eyes at bedtime for . Apply a thin line into both eyes at  bedtime       No current facility-administered medications for this visit.     ROS:  Limited secondary to cognitive impairment but today pt reports as noted above.    Vitals:  /72   Pulse 72   Temp 98  F (36.7  C)   Resp 17   Ht 1.676 m (5' 6\")   Wt 103.1 kg (227 lb 4.8 oz)   SpO2 91%   BMI 36.69 kg/m    Exam:  GENERAL APPEARANCE:  Alert, in no distress, pleasant, cooperative  EYES: no discharge or mattering on lids or lashes noted  ENT:  moist mucous membranes, hearing acuity intact  NECK: supple, symmetrical  RESP: no respiratory distress, Lung sounds clear, patient is on room air  CV:  rate and rhythm regular, no murmur. Edema trace in bilateral lower extremities. VASCULAR: warm extremities without open areas.  ABDOMEN: normal bowel sounds, soft, nontender.  M/S:   Gait and station: requiring assistance with all cares. , no tenderness or swelling of the joints; able to move all extremities   SKIN:  Inspection and palpation of skin and subcutaneous tissue: skin warm, dry and intact without rashes  NEURO: no facial asymmetry, no speech deficits and able to follow directions, moves all extremities symmetrically  PSYCH:  insight, judgement, and memory impaired affect and mood " normal    Lab/Diagnostic data:  Recent labs in [facility] reviewed by me today.     ASSESSMENT/PLAN:  Aspiration pneumonia  Secondary to achalasia.  Required intubation x 3 days. Treated with ceftriaxone.  No signs or symptoms of recurrent pneumonia  -- Monitor respiratory status    Dementia  Currently lives in an assisted living.  Had worsening delirium after EGD but cleared after gabapentin, melatonin, and Seroquel were held.  None were restarted at discharge.  She appears calm and pleasant today.  --continue with 24/7 nursing on secure unit. OT to administer cognitive testing.    Achalasia   s/p botilnum toxin (2/2019, 5/2021,11.2024)   EGD was completed 11/07/2024, which demonstrated mildly hypertonic LES, consistent with known history of achalasia which was injected with botulinum toxin.   - Continue with Omeprazole daily  - Repeat EGD PRN for re-treatment if symptoms recure.    CAD s/p CABG 2005  HFrEF (EF 35%)  HTN  Chronic a-fib  Hx TIA  ADALID Vas of 9 Points with 17% risk of stroke /TIA/ or embolus. -120s, euvolemic on exam.  --continue with metoprolol 100 mg daily, lasix 20 mg daily, potassium 10 mg daily, statin, eliquis 5 mg bid  - Continue to monitor blood pressure and heart rate, adjust medications as needed.    Chronic pain  No complaints of pain today.  Previously on gabapentin which was stopped due to altered mental status.  -- Continue with Tylenol arthritis 3 times a day  -- Cymbalta 60 mg daily.    Physical deconditioning  Secondary to recent hospitalization and underlying medical conditions.   --ongoing PT/OT for strengthening.     Electronically signed by:  VISH Ortega CNP

## 2024-11-14 LAB
GAMMA INTERFERON BACKGROUND BLD IA-ACNC: 0.04 IU/ML
M TB IFN-G BLD-IMP: NEGATIVE
M TB IFN-G CD4+ BCKGRND COR BLD-ACNC: 1.31 IU/ML
MITOGEN IGNF BCKGRD COR BLD-ACNC: 0.01 IU/ML
MITOGEN IGNF BCKGRD COR BLD-ACNC: 0.01 IU/ML
QUANTIFERON MITOGEN: 1.35 IU/ML
QUANTIFERON NIL TUBE: 0.04 IU/ML
QUANTIFERON TB1 TUBE: 0.05 IU/ML
QUANTIFERON TB2 TUBE: 0.05

## 2024-11-18 ENCOUNTER — TRANSITIONAL CARE UNIT VISIT (OUTPATIENT)
Dept: GERIATRICS | Facility: CLINIC | Age: 84
End: 2024-11-18
Payer: COMMERCIAL

## 2024-11-18 VITALS
HEIGHT: 66 IN | DIASTOLIC BLOOD PRESSURE: 64 MMHG | SYSTOLIC BLOOD PRESSURE: 103 MMHG | WEIGHT: 220.5 LBS | TEMPERATURE: 97.3 F | BODY MASS INDEX: 35.44 KG/M2 | HEART RATE: 92 BPM | OXYGEN SATURATION: 94 % | RESPIRATION RATE: 16 BRPM

## 2024-11-18 DIAGNOSIS — F03.90 DEMENTIA, UNSPECIFIED DEMENTIA SEVERITY, UNSPECIFIED DEMENTIA TYPE, UNSPECIFIED WHETHER BEHAVIORAL, PSYCHOTIC, OR MOOD DISTURBANCE OR ANXIETY (H): ICD-10-CM

## 2024-11-18 DIAGNOSIS — J69.0 ASPIRATION PNEUMONIA, UNSPECIFIED ASPIRATION PNEUMONIA TYPE, UNSPECIFIED LATERALITY, UNSPECIFIED PART OF LUNG (H): Primary | ICD-10-CM

## 2024-11-18 DIAGNOSIS — I48.20 CHRONIC ATRIAL FIBRILLATION (H): ICD-10-CM

## 2024-11-18 DIAGNOSIS — G89.29 OTHER CHRONIC PAIN: ICD-10-CM

## 2024-11-18 DIAGNOSIS — I10 BENIGN ESSENTIAL HYPERTENSION: ICD-10-CM

## 2024-11-18 DIAGNOSIS — K22.0 ACHALASIA: ICD-10-CM

## 2024-11-18 DIAGNOSIS — I50.42 CHRONIC COMBINED SYSTOLIC AND DIASTOLIC CONGESTIVE HEART FAILURE (H): ICD-10-CM

## 2024-11-18 DIAGNOSIS — R53.81 PHYSICAL DECONDITIONING: ICD-10-CM

## 2024-11-18 PROBLEM — F02.80 MIXED ALZHEIMER'S AND VASCULAR DEMENTIA (H): Status: ACTIVE | Noted: 2018-12-31

## 2024-11-18 PROBLEM — Z79.01 LONG TERM CURRENT USE OF ANTICOAGULANT THERAPY: Status: ACTIVE | Noted: 2018-06-08

## 2024-11-18 PROBLEM — N30.00 ACUTE CYSTITIS: Status: ACTIVE | Noted: 2023-03-25

## 2024-11-18 PROBLEM — M81.0 OSTEOPOROSIS: Status: ACTIVE | Noted: 2018-06-21

## 2024-11-18 PROBLEM — G30.9 MIXED ALZHEIMER'S AND VASCULAR DEMENTIA (H): Status: ACTIVE | Noted: 2018-12-31

## 2024-11-18 PROBLEM — D75.89 MACROCYTOSIS WITHOUT ANEMIA: Status: ACTIVE | Noted: 2019-05-23

## 2024-11-18 PROBLEM — K52.9 CHRONIC DIARRHEA: Chronic | Status: ACTIVE | Noted: 2018-11-10

## 2024-11-18 PROBLEM — H02.055 TRICHIASIS OF LEFT LOWER EYELID: Status: ACTIVE | Noted: 2023-01-05

## 2024-11-18 PROBLEM — I71.43 ANEURYSM OF INFRARENAL ABDOMINAL AORTA (H): Status: ACTIVE | Noted: 2017-01-28

## 2024-11-18 PROBLEM — H02.831 DERMATOCHALASIS OF BOTH UPPER EYELIDS: Status: ACTIVE | Noted: 2019-12-17

## 2024-11-18 PROBLEM — H04.123 DRY EYES, BILATERAL: Status: ACTIVE | Noted: 2019-12-17

## 2024-11-18 PROBLEM — F01.50 MIXED ALZHEIMER'S AND VASCULAR DEMENTIA (H): Status: ACTIVE | Noted: 2018-12-31

## 2024-11-18 PROBLEM — R31.9 HEMATURIA: Status: ACTIVE | Noted: 2017-02-08

## 2024-11-18 PROBLEM — M54.50 CHRONIC LOW BACK PAIN: Status: ACTIVE | Noted: 2018-06-21

## 2024-11-18 PROBLEM — F33.41 RECURRENT MAJOR DEPRESSIVE DISORDER, IN PARTIAL REMISSION (H): Status: ACTIVE | Noted: 2018-06-21

## 2024-11-18 PROBLEM — H52.4 MYOPIA OF BOTH EYES WITH ASTIGMATISM AND PRESBYOPIA: Status: ACTIVE | Noted: 2019-12-17

## 2024-11-18 PROBLEM — M53.3 CHRONIC LEFT SI JOINT PAIN: Status: ACTIVE | Noted: 2020-08-26

## 2024-11-18 PROBLEM — J96.01 ACUTE RESPIRATORY FAILURE WITH HYPOXIA (H): Status: ACTIVE | Noted: 2024-11-05

## 2024-11-18 PROBLEM — H90.3 SENSORINEURAL HEARING LOSS, BILATERAL: Status: ACTIVE | Noted: 2022-07-01

## 2024-11-18 PROBLEM — I48.91 AF (ATRIAL FIBRILLATION) (H): Status: ACTIVE | Noted: 2023-03-25

## 2024-11-18 PROBLEM — R20.8 DECREASED PERIPHERAL VIBRATORY SENSE: Chronic | Status: ACTIVE | Noted: 2019-01-01

## 2024-11-18 PROBLEM — E55.9 VITAMIN D DEFICIENCY: Status: ACTIVE | Noted: 2020-08-26

## 2024-11-18 PROBLEM — H91.93 DECREASED HEARING OF BOTH EARS: Status: ACTIVE | Noted: 2020-02-27

## 2024-11-18 PROBLEM — I50.20 SYSTOLIC HEART FAILURE (H): Status: ACTIVE | Noted: 2018-08-07

## 2024-11-18 PROBLEM — Z66 DNAR (DO NOT ATTEMPT RESUSCITATION): Status: ACTIVE | Noted: 2018-11-22

## 2024-11-18 PROBLEM — K21.9 GASTROESOPHAGEAL REFLUX DISEASE WITHOUT ESOPHAGITIS: Status: ACTIVE | Noted: 2017-07-28

## 2024-11-18 PROBLEM — G93.41 ACUTE METABOLIC ENCEPHALOPATHY: Status: ACTIVE | Noted: 2024-11-02

## 2024-11-18 PROBLEM — R06.89 ACUTE RESPIRATORY INSUFFICIENCY: Status: ACTIVE | Noted: 2023-03-25

## 2024-11-18 PROBLEM — M47.816 SPONDYLOSIS OF LUMBAR REGION WITHOUT MYELOPATHY OR RADICULOPATHY: Status: ACTIVE | Noted: 2019-11-07

## 2024-11-18 PROBLEM — M19.032 ARTHRITIS OF LEFT WRIST: Status: ACTIVE | Noted: 2020-02-27

## 2024-11-18 PROBLEM — R11.2 INTRACTABLE VOMITING WITH NAUSEA: Status: ACTIVE | Noted: 2018-11-22

## 2024-11-18 PROBLEM — I50.22 CHRONIC SYSTOLIC CHF (CONGESTIVE HEART FAILURE) (H): Status: ACTIVE | Noted: 2018-08-07

## 2024-11-18 PROBLEM — H34.8110 CENTRAL RETINAL VEIN OCCLUSION WITH MACULAR EDEMA OF RIGHT EYE (H): Status: ACTIVE | Noted: 2021-06-16

## 2024-11-18 PROBLEM — H52.203 MYOPIA OF BOTH EYES WITH ASTIGMATISM AND PRESBYOPIA: Status: ACTIVE | Noted: 2019-12-17

## 2024-11-18 PROBLEM — H02.834 DERMATOCHALASIS OF BOTH UPPER EYELIDS: Status: ACTIVE | Noted: 2019-12-17

## 2024-11-18 PROBLEM — L91.8 SKIN TAG: Status: ACTIVE | Noted: 2019-05-23

## 2024-11-18 PROBLEM — H52.13 MYOPIA OF BOTH EYES WITH ASTIGMATISM AND PRESBYOPIA: Status: ACTIVE | Noted: 2019-12-17

## 2024-11-18 PROBLEM — H57.02 ANISOCORIA: Status: ACTIVE | Noted: 2019-12-17

## 2024-11-18 PROBLEM — Z95.828 PRESENCE OF IVC FILTER: Status: ACTIVE | Noted: 2019-04-24

## 2024-11-18 PROBLEM — H61.21 EXCESSIVE CERUMEN IN EAR CANAL, RIGHT: Status: ACTIVE | Noted: 2018-12-31

## 2024-11-18 PROBLEM — R09.02 HYPOXIA: Status: ACTIVE | Noted: 2023-03-25

## 2024-11-18 PROBLEM — E53.8 VITAMIN B 12 DEFICIENCY: Status: ACTIVE | Noted: 2019-05-31

## 2024-11-18 PROBLEM — R32 URINARY INCONTINENCE: Chronic | Status: ACTIVE | Noted: 2019-04-06

## 2024-11-18 NOTE — PROGRESS NOTES
"[facility] GERIATRICS    Chief Complaint   Patient presents with    RECHECK     HPI:  Carmelina Gaspar is a 84 year old  (1940), who is being seen today for an episodic care visit at: [facility] ([facility]) [[id number]]. Today's concern is:     Carmelina was visited today while in her room sitting up in the wheelchair.  She shares that she has not been sleeping well here at the [facility].  She is looking forward to getting in her own bed with her own blankets and pillows.  Appetite has been good and she has not noted any difficulty chewing or swallowing.  She intermittently has shortness of breath but feels that this is her baseline.  No complaints of chest pain or cough.  Had a bowel movement this morning.    Allergies, and PMH/PSH reviewed in [facility] today.  REVIEW OF SYSTEMS:  4 point ROS including Respiratory, CV, GI and , other than that noted in the HPI,  is negative    Objective:   /64   Pulse 92   Temp 97.3  F (36.3  C)   Resp 16   Ht 1.676 m (5' 6\")   Wt 100 kg (220 lb 8 oz)   SpO2 94%   BMI 35.59 kg/m    GENERAL APPEARANCE:  Alert, in no distress, pleasant, cooperative  EYES: no discharge or mattering on lids or lashes noted  ENT:  moist mucous membranes, hearing acuity intact  NECK: supple, symmetrical  RESP: no respiratory distress, Lung sounds clear, patient is on room air  CV:  rate and rhythm regular, no murmur. Edema trace in bilateral lower extremities. VASCULAR: warm extremities without open areas.  ABDOMEN: normal bowel sounds, soft, nontender.  M/S:   Gait and station: requiring assistance with all cares. , no tenderness or swelling of the joints; able to move all extremities   SKIN:  Inspection and palpation of skin and subcutaneous tissue: skin warm, dry and intact without rashes  NEURO: no facial asymmetry, no speech deficits and able to follow directions, moves all extremities symmetrically  PSYCH:  insight, judgement, and memory impaired affect and mood normal      Recent labs " reviewed in epic    Assessment/Plan:  Aspiration pneumonia  Secondary to achalasia.  Required intubation x 3 days. Treated with ceftriaxone.  No signs or symptoms of recurrent pneumonia  -- Monitor respiratory status    Dementia  Currently lives in an assisted living.  Had worsening delirium after EGD but cleared after gabapentin, melatonin, and Seroquel were held.  None were restarted at discharge.  She appears calm and pleasant today.  --continue with 24/7 nursing on secure unit. OT to administer cognitive testing.  --return to AL upon completion of therapy.    Achalasia   s/p botilnum toxin (2/2019, 5/2021,11.2024)   EGD was completed 11/07/2024, which demonstrated mildly hypertonic LES, consistent with known history of achalasia which was injected with botulinum toxin.   - Continue with Omeprazole daily  - Repeat EGD PRN for re-treatment if symptoms recure.  --SLP following in house.     CAD s/p CABG 2005  HFrEF (EF 35%)  HTN  Chronic a-fib  Hx TIA  ADALID Vas of 9 Points with 17% risk of stroke /TIA/ or embolus. -150s, HR 60-80s, euvolemic on exam.  --continue with metoprolol 100 mg daily, lasix 20 mg daily, potassium 10 mg daily, statin, eliquis 5 mg bid  - Continue to monitor blood pressure and heart rate, adjust medications as needed.    Chronic pain  No complaints of pain today.  Previously on gabapentin which was stopped due to altered mental status.  -- Continue with Tylenol arthritis 3 times a day  -- Cymbalta 60 mg daily.    Physical deconditioning  Secondary to recent hospitalization and underlying medical conditions.   --ongoing PT/OT for strengthening.     MED REC REQUIRED  Post Medication Reconciliation Status: medication reconcilation previously completed during another office visit      Electronically signed by: VISH Ortega CNP

## 2024-11-18 NOTE — LETTER
" 11/18/2024      Carmelina Gaspar  [address]  [facility] [address]        [facility] GERIATRICS    Chief Complaint   Patient presents with     RECHECK     HPI:  Carmelina Gaspar is a 84 year old  (1940), who is being seen today for an episodic care visit at: [facility] ([facility]) [[id number]]. Today's concern is:     Carmelina was visited today while in her room sitting up in the wheelchair.  She shares that she has not been sleeping well here at the [facility].  She is looking forward to getting in her own bed with her own blankets and pillows.  Appetite has been good and she has not noted any difficulty chewing or swallowing.  She intermittently has shortness of breath but feels that this is her baseline.  No complaints of chest pain or cough.  Had a bowel movement this morning.    Allergies, and PMH/PSH reviewed in [facility] today.  REVIEW OF SYSTEMS:  4 point ROS including Respiratory, CV, GI and , other than that noted in the HPI,  is negative    Objective:   /64   Pulse 92   Temp 97.3  F (36.3  C)   Resp 16   Ht 1.676 m (5' 6\")   Wt 100 kg (220 lb 8 oz)   SpO2 94%   BMI 35.59 kg/m    GENERAL APPEARANCE:  Alert, in no distress, pleasant, cooperative  EYES: no discharge or mattering on lids or lashes noted  ENT:  moist mucous membranes, hearing acuity intact  NECK: supple, symmetrical  RESP: no respiratory distress, Lung sounds clear, patient is on room air  CV:  rate and rhythm regular, no murmur. Edema trace in bilateral lower extremities. VASCULAR: warm extremities without open areas.  ABDOMEN: normal bowel sounds, soft, nontender.  M/S:   Gait and station: requiring assistance with all cares. , no tenderness or swelling of the joints; able to move all extremities   SKIN:  Inspection and palpation of skin and subcutaneous tissue: skin warm, dry and intact without rashes  NEURO: no facial asymmetry, no speech deficits and able to follow directions, moves all extremities " symmetrically  PSYCH:  insight, judgement, and memory impaired affect and mood normal      Recent labs reviewed in epic    Assessment/Plan:  Aspiration pneumonia  Secondary to achalasia.  Required intubation x 3 days. Treated with ceftriaxone.  No signs or symptoms of recurrent pneumonia  -- Monitor respiratory status    Dementia  Currently lives in an assisted living.  Had worsening delirium after EGD but cleared after gabapentin, melatonin, and Seroquel were held.  None were restarted at discharge.  She appears calm and pleasant today.  --continue with 24/7 nursing on secure unit. OT to administer cognitive testing.  --return to AL upon completion of therapy.    Achalasia   s/p botilnum toxin (2/2019, 5/2021,11.2024)   EGD was completed 11/07/2024, which demonstrated mildly hypertonic LES, consistent with known history of achalasia which was injected with botulinum toxin.   - Continue with Omeprazole daily  - Repeat EGD PRN for re-treatment if symptoms recure.  --SLP following in house.     CAD s/p CABG 2005  HFrEF (EF 35%)  HTN  Chronic a-fib  Hx TIA  ADALID Vas of 9 Points with 17% risk of stroke /TIA/ or embolus. -150s, HR 60-80s, euvolemic on exam.  --continue with metoprolol 100 mg daily, lasix 20 mg daily, potassium 10 mg daily, statin, eliquis 5 mg bid  - Continue to monitor blood pressure and heart rate, adjust medications as needed.    Chronic pain  No complaints of pain today.  Previously on gabapentin which was stopped due to altered mental status.  -- Continue with Tylenol arthritis 3 times a day  -- Cymbalta 60 mg daily.    Physical deconditioning  Secondary to recent hospitalization and underlying medical conditions.   --ongoing PT/OT for strengthening.     MED REC REQUIRED  Post Medication Reconciliation Status: medication reconcilation previously completed during another office visit      Electronically signed by: VISH Ortega CNP         Sincerely,        VISH Ortega  CNP

## 2024-11-20 ENCOUNTER — LAB REQUISITION (OUTPATIENT)
Dept: LAB | Facility: CLINIC | Age: 84
End: 2024-11-20
Payer: COMMERCIAL

## 2024-11-20 DIAGNOSIS — I10 ESSENTIAL (PRIMARY) HYPERTENSION: ICD-10-CM

## 2024-11-20 DIAGNOSIS — J18.9 PNEUMONIA, UNSPECIFIED ORGANISM: ICD-10-CM

## 2024-11-20 NOTE — PROGRESS NOTES
[facility] GERIATRICS    PRIMARY CARE PROVIDER AND CLINIC:  Deidra Dick NP, [address] / [facility] [address]  Chief Complaint   Patient presents with    Hospital F/U      [place] Medical Record Number:  [id number]  Place of Service where encounter took place:  [facility] ([facility])     Carmelina Gaspar  is a 84 year old  (1940), admitted to the above facility from  [facility]. Hospital stay 11/2/24 through 11/12/24..     Hospital course was reviewed by me, is as per the hospital discharge summary and nurse practitioner note.    Patient has a past medical history of dementia, achalasia, ischemic cardiomyopathy, atrial fibrillation, diabetes mellitus type 2.    She was hospitalized for the evaluation of pneumonia.  She presented to the hospital from the assisted living for the evaluation of confusion and chest congestion.  She was intubated for 3 days.  She had a EGD 11/7/2024 and underwent a Botox injection.  Course complicated by delirium.  Patient was seen by palliative care who noted delirium superimposed upon progressive dementia.  She was not felt to qualify for hospice at this time.    History limited from patient secondary to cognitive impairment  She denies cough, chest pain, shortness of breath this morning.  She does not recall difficulty swallowing.  She states she is walking a few steps with a walker.      CODE STATUS/ADVANCE DIRECTIVES DISCUSSION:  No CPR- Do NOT Intubate  DNR / DNI  ALLERGIES:   Allergies   Allergen Reactions    Gabapentin     Levofloxacin     Etodolac     Oxycodone      Other Reaction(s): Gastrointestinal    Upset stomach      PAST MEDICAL HISTORY:   Past Medical History:   Diagnosis Date    Atrial fibrillation (H)     CAD (coronary artery disease)     Central retinal vein occlusion of right eye (H)     CKD (chronic kidney disease) stage 3, GFR 30-59 ml/min (H)     Dementia (H)     Depression     Hypothyroidism     Ischemic cardiomyopathy     Urinary  incontinence       PAST SURGICAL HISTORY:   has a past surgical history that includes other surgical history (03/2017); Cholecystectomy; and Replacement Total Knee (Bilateral).  FAMILY HISTORY: family history is not on file.  SOCIAL HISTORY:   reports that she has quit smoking. She does not have any smokeless tobacco history on file. She reports that she does not drink alcohol and does not use drugs.  Patient's living condition: lives in an assisted living facility    Current medications were reviewed by me today    Current Outpatient Medications   Medication Sig Dispense Refill    acetaminophen (TYLENOL) 325 MG tablet Take 650 mg by mouth 3 times daily.      albuterol (PROAIR HFA/PROVENTIL HFA/VENTOLIN HFA) 108 (90 Base) MCG/ACT inhaler Inhale 2 puffs into the lungs every 4 hours as needed for shortness of breath, wheezing or cough      apixaban ANTICOAGULANT (ELIQUIS) 5 MG tablet Take 5 mg by mouth 2 times daily      atorvastatin (LIPITOR) 20 MG tablet Take 20 mg by mouth At Bedtime      carboxymethylcellulose PF (REFRESH PLUS) 0.5 % ophthalmic solution Place 1 drop into both eyes 2 times daily.      clotrimazole (LOTRIMIN) 1 % external cream Apply topically 2 times daily.      DULoxetine (CYMBALTA) 60 MG capsule Take 60 mg by mouth daily.      furosemide (LASIX) 20 MG tablet Take 20 mg by mouth daily.      levothyroxine (SYNTHROID/LEVOTHROID) 137 MCG tablet Take 137 mcg by mouth daily.      Lidocaine (LIDOCARE) 4 % Patch Place 1 patch onto the skin every 24 hours. To prevent lidocaine toxicity, patient should be patch free for 12 hrs daily.      loperamide (IMODIUM) 2 MG capsule Take 2 mg by mouth 2 times daily as needed for diarrhea.      metoprolol succinate ER (TOPROL XL) 100 MG 24 hr tablet Take 100 mg by mouth daily.      mirabegron (MYRBETRIQ) 25 MG 24 hr tablet Take 25 mg by mouth daily.      omeprazole (PRILOSEC) 20 MG DR capsule Take 20 mg by mouth daily.      potassium chloride ER (K-TAB) 20 MEQ CR  "tablet Take 10 mEq by mouth daily.      Skin Protectants, Misc. (HYDROCERIN EX) Externally apply topically daily as needed.      Vitamin D3 (CHOLECALCIFEROL) 25 mcg (1000 units) tablet Take 25 mcg by mouth daily.      White Petrolatum-Mineral Oil (SOOTHE NIGHTTIME OP) Apply to eye. Instill 1 application in both  eyes at bedtime for . Apply a thin line into both eyes at  bedtime       No current facility-administered medications for this visit.       ROS:  Limited secondary to cognitive impairment but today pt reports no acute concerns    Vitals:  /63   Pulse 64   Temp 97.9  F (36.6  C)   Resp 16   Ht 1.676 m (5' 6\")   Wt 100 kg (220 lb 8 oz)   SpO2 91%   BMI 35.59 kg/m    Exam:  Pleasant, obese female, sitting in chair in her room.  She appears well and is in good spirits  HEENT: Edentulous  Oral mucosa moist  Lungs: Respiratory rate 12, unlabored.  Bibasilar crackles  CV irregular, heart rate 60s  Abdomen soft, protuberant  Extremities: No edema  Neuro: Alert, pleasant, oriented to self.  Is unable to recall any details of recent hospitalization.  No tremors.  No focal weakness.  Gait was not assessed by me.    Lab/Diagnostic data:  Most Recent 3 CBC's:  Recent Labs   Lab Test 11/21/24  0533 12/10/18  0624 12/09/18  0510   WBC 5.9 5.9 6.3   HGB 13.5 12.5 12.6   * 103* 103*    231 244     Most Recent 3 BMP's:  Recent Labs   Lab Test 11/21/24  0533 12/10/18  0624 12/09/18  0510    140 141   POTASSIUM 4.3 4.4 4.3   CHLORIDE 100 105 104   CO2 28 25 25   BUN 17.3 21 20   CR 1.01* 1.30* 1.35*   ANIONGAP 13 10 12   RAYO 10.0 9.9 9.6   * 86 87     Most Recent 2 LFT's:No lab results found.      ASSESSMENT/PLAN:    Pneumonia, likely aspiration, in the setting of achalasia, underlying cognitive impairment  Status post intubation and course of antibiotics.  Status post Botox esophageal injection  Respiratory status appears stable.  Patient denies swallowing difficulties  Plan: Therapies.  " Monitor swallowing function, continue PPI.  Monitor respiratory status and swallowing function.    Dementia with delirium in the setting of acute illness  Patient currently is calm, pleasant though very confused  Unclear if this approximates her recent baseline  Plan: Therapies, monitor mental and functional status.  Should patient experience recurrent significant aspiration, she might be a candidate for hospice    Chronic atrial fibrillation  Hypertension  History of heart failure, combined systolic and diastolic  CV status appears stable  Plan: Continue apixaban, statin, low-dose furosemide, metoprolol.  Monitor exam, BMP      Dave Guzman MD

## 2024-11-21 ENCOUNTER — TRANSITIONAL CARE UNIT VISIT (OUTPATIENT)
Dept: GERIATRICS | Facility: CLINIC | Age: 84
End: 2024-11-21
Payer: COMMERCIAL

## 2024-11-21 ENCOUNTER — TELEPHONE (OUTPATIENT)
Dept: GERIATRICS | Facility: CLINIC | Age: 84
End: 2024-11-21

## 2024-11-21 ENCOUNTER — LAB REQUISITION (OUTPATIENT)
Dept: LAB | Facility: CLINIC | Age: 84
End: 2024-11-21
Payer: COMMERCIAL

## 2024-11-21 VITALS
HEART RATE: 64 BPM | OXYGEN SATURATION: 91 % | WEIGHT: 220.5 LBS | TEMPERATURE: 97.9 F | RESPIRATION RATE: 16 BRPM | HEIGHT: 66 IN | BODY MASS INDEX: 35.44 KG/M2 | SYSTOLIC BLOOD PRESSURE: 108 MMHG | DIASTOLIC BLOOD PRESSURE: 63 MMHG

## 2024-11-21 DIAGNOSIS — I48.20 CHRONIC ATRIAL FIBRILLATION (H): ICD-10-CM

## 2024-11-21 DIAGNOSIS — I50.42 CHRONIC COMBINED SYSTOLIC AND DIASTOLIC CONGESTIVE HEART FAILURE (H): ICD-10-CM

## 2024-11-21 DIAGNOSIS — K22.0 ACHALASIA: ICD-10-CM

## 2024-11-21 DIAGNOSIS — F03.90 DEMENTIA, UNSPECIFIED DEMENTIA SEVERITY, UNSPECIFIED DEMENTIA TYPE, UNSPECIFIED WHETHER BEHAVIORAL, PSYCHOTIC, OR MOOD DISTURBANCE OR ANXIETY (H): ICD-10-CM

## 2024-11-21 DIAGNOSIS — R30.0 DYSURIA: ICD-10-CM

## 2024-11-21 DIAGNOSIS — J69.0 ASPIRATION PNEUMONIA, UNSPECIFIED ASPIRATION PNEUMONIA TYPE, UNSPECIFIED LATERALITY, UNSPECIFIED PART OF LUNG (H): Primary | ICD-10-CM

## 2024-11-21 LAB
ALBUMIN UR-MCNC: 10 MG/DL
ANION GAP SERPL CALCULATED.3IONS-SCNC: 13 MMOL/L (ref 7–15)
APPEARANCE UR: CLEAR
BASOPHILS # BLD AUTO: 0.1 10E3/UL (ref 0–0.2)
BASOPHILS NFR BLD AUTO: 2 %
BILIRUB UR QL STRIP: NEGATIVE
BUN SERPL-MCNC: 17.3 MG/DL (ref 8–23)
CALCIUM SERPL-MCNC: 10 MG/DL (ref 8.8–10.4)
CHLORIDE SERPL-SCNC: 100 MMOL/L (ref 98–107)
COLOR UR AUTO: YELLOW
CREAT SERPL-MCNC: 1.01 MG/DL (ref 0.51–0.95)
EGFRCR SERPLBLD CKD-EPI 2021: 54 ML/MIN/1.73M2
EOSINOPHIL # BLD AUTO: 0.3 10E3/UL (ref 0–0.7)
EOSINOPHIL NFR BLD AUTO: 5 %
ERYTHROCYTE [DISTWIDTH] IN BLOOD BY AUTOMATED COUNT: 12.8 % (ref 10–15)
GLUCOSE SERPL-MCNC: 108 MG/DL (ref 70–99)
GLUCOSE UR STRIP-MCNC: NEGATIVE MG/DL
HCO3 SERPL-SCNC: 28 MMOL/L (ref 22–29)
HCT VFR BLD AUTO: 40.7 % (ref 35–47)
HGB BLD-MCNC: 13.5 G/DL (ref 11.7–15.7)
HGB UR QL STRIP: NEGATIVE
HYALINE CASTS: 11 /LPF
IMM GRANULOCYTES # BLD: 0 10E3/UL
IMM GRANULOCYTES NFR BLD: 0 %
KETONES UR STRIP-MCNC: NEGATIVE MG/DL
LEUKOCYTE ESTERASE UR QL STRIP: ABNORMAL
LYMPHOCYTES # BLD AUTO: 1.2 10E3/UL (ref 0.8–5.3)
LYMPHOCYTES NFR BLD AUTO: 20 %
MCH RBC QN AUTO: 34.5 PG (ref 26.5–33)
MCHC RBC AUTO-ENTMCNC: 33.2 G/DL (ref 31.5–36.5)
MCV RBC AUTO: 104 FL (ref 78–100)
MONOCYTES # BLD AUTO: 0.7 10E3/UL (ref 0–1.3)
MONOCYTES NFR BLD AUTO: 12 %
MUCOUS THREADS #/AREA URNS LPF: PRESENT /LPF
NEUTROPHILS # BLD AUTO: 3.6 10E3/UL (ref 1.6–8.3)
NEUTROPHILS NFR BLD AUTO: 62 %
NITRATE UR QL: NEGATIVE
NRBC # BLD AUTO: 0 10E3/UL
NRBC BLD AUTO-RTO: 0 /100
PH UR STRIP: 5.5 [PH] (ref 5–7)
PLATELET # BLD AUTO: 256 10E3/UL (ref 150–450)
POTASSIUM SERPL-SCNC: 4.3 MMOL/L (ref 3.4–5.3)
RBC # BLD AUTO: 3.91 10E6/UL (ref 3.8–5.2)
RBC URINE: 0 /HPF
SODIUM SERPL-SCNC: 141 MMOL/L (ref 135–145)
SP GR UR STRIP: 1.02 (ref 1–1.03)
UROBILINOGEN UR STRIP-MCNC: NORMAL MG/DL
WBC # BLD AUTO: 5.9 10E3/UL (ref 4–11)
WBC CLUMPS #/AREA URNS HPF: PRESENT /HPF
WBC URINE: 42 /HPF
YEAST #/AREA URNS HPF: ABNORMAL /HPF

## 2024-11-21 PROCEDURE — 87106 FUNGI IDENTIFICATION YEAST: CPT | Mod: ORL | Performed by: NURSE PRACTITIONER

## 2024-11-21 PROCEDURE — 85025 COMPLETE CBC W/AUTO DIFF WBC: CPT | Mod: ORL | Performed by: NURSE PRACTITIONER

## 2024-11-21 PROCEDURE — 36415 COLL VENOUS BLD VENIPUNCTURE: CPT | Performed by: NURSE PRACTITIONER

## 2024-11-21 PROCEDURE — 80048 BASIC METABOLIC PNL TOTAL CA: CPT | Mod: ORL | Performed by: NURSE PRACTITIONER

## 2024-11-21 PROCEDURE — P9604 ONE-WAY ALLOW PRORATED TRIP: HCPCS | Mod: ORL | Performed by: NURSE PRACTITIONER

## 2024-11-21 PROCEDURE — 85014 HEMATOCRIT: CPT | Performed by: NURSE PRACTITIONER

## 2024-11-21 PROCEDURE — 81001 URINALYSIS AUTO W/SCOPE: CPT | Mod: ORL | Performed by: NURSE PRACTITIONER

## 2024-11-21 RX ORDER — AMOXICILLIN 250 MG
2 CAPSULE ORAL DAILY
COMMUNITY
Start: 2024-11-21

## 2024-11-21 RX ORDER — BISACODYL 10 MG
10 SUPPOSITORY, RECTAL RECTAL DAILY PRN
COMMUNITY
Start: 2024-11-21

## 2024-11-21 RX ORDER — QUETIAPINE FUMARATE 25 MG/1
12.5 TABLET, FILM COATED ORAL 2 TIMES DAILY
COMMUNITY
Start: 2024-11-21

## 2024-11-21 NOTE — TELEPHONE ENCOUNTER
"[facility] Triage Call    Provider: Dave Guzman MD  Facility: [facility]  Facility Type:  TCU    Caller: Lorraine  Call Back Number: [phone]    Allergies:    Allergies   Allergen Reactions    Gabapentin     Levofloxacin     Etodolac     Oxycodone      Other Reaction(s): Gastrointestinal    Upset stomach        SBAR:     S-(situation): Nurse is calling to report that patient is seeing people in his room that are not there and she also [place].      B-(background): n/a    A-(assessment): Afebrile.  Other VS:  P=82, R=15, FN=961/78, O2=90%    R-(recommendations): ?UA/UC.  Please advise further.         Telephone encounter sent to:  Dave Guzman MD    Please send response/orders to \"Geriatrics Nurse Pool\"    Phani Garduno RN      "

## 2024-11-21 NOTE — LETTER
11/21/2024      Carmelina Gaspar  [address]  [facility] [address]        [facility] GERIATRICS    PRIMARY CARE PROVIDER AND CLINIC:  Deidra Dick NP, [address] / [facility] [address]  Chief Complaint   Patient presents with     Hospital F/U      [place] Medical Record Number:  [id number]  Place of Service where encounter took place:  [facility] ([facility])     Carmelina Gaspar  is a 84 year old  (1940), admitted to the above facility from  [facility]. Hospital stay 11/2/24 through 11/12/24..     Hospital course was reviewed by me, is as per the hospital discharge summary and nurse practitioner note.    Patient has a past medical history of dementia, achalasia, ischemic cardiomyopathy, atrial fibrillation, diabetes mellitus type 2.    She was hospitalized for the evaluation of pneumonia.  She presented to the hospital from the assisted living for the evaluation of confusion and chest congestion.  She was intubated for 3 days.  She had a EGD 11/7/2024 and underwent a Botox injection.  Course complicated by delirium.  Patient was seen by palliative care who noted delirium superimposed upon progressive dementia.  She was not felt to qualify for hospice at this time.    History limited from patient secondary to cognitive impairment  She denies cough, chest pain, shortness of breath this morning.  She does not recall difficulty swallowing.  She states she is walking a few steps with a walker.      CODE STATUS/ADVANCE DIRECTIVES DISCUSSION:  No CPR- Do NOT Intubate  DNR / DNI  ALLERGIES:   Allergies   Allergen Reactions     Gabapentin      Levofloxacin      Etodolac      Oxycodone      Other Reaction(s): Gastrointestinal    Upset stomach      PAST MEDICAL HISTORY:   Past Medical History:   Diagnosis Date     Atrial fibrillation (H)      CAD (coronary artery disease)      Central retinal vein occlusion of right eye (H)      CKD (chronic kidney disease) stage 3, GFR 30-59 ml/min  (H)      Dementia (H)      Depression      Hypothyroidism      Ischemic cardiomyopathy      Urinary incontinence       PAST SURGICAL HISTORY:   has a past surgical history that includes other surgical history (03/2017); Cholecystectomy; and Replacement Total Knee (Bilateral).  FAMILY HISTORY: family history is not on file.  SOCIAL HISTORY:   reports that she has quit smoking. She does not have any smokeless tobacco history on file. She reports that she does not drink alcohol and does not use drugs.  Patient's living condition: lives in an assisted living facility    Current medications were reviewed by me today    Current Outpatient Medications   Medication Sig Dispense Refill     acetaminophen (TYLENOL) 325 MG tablet Take 650 mg by mouth 3 times daily.       albuterol (PROAIR HFA/PROVENTIL HFA/VENTOLIN HFA) 108 (90 Base) MCG/ACT inhaler Inhale 2 puffs into the lungs every 4 hours as needed for shortness of breath, wheezing or cough       apixaban ANTICOAGULANT (ELIQUIS) 5 MG tablet Take 5 mg by mouth 2 times daily       atorvastatin (LIPITOR) 20 MG tablet Take 20 mg by mouth At Bedtime       carboxymethylcellulose PF (REFRESH PLUS) 0.5 % ophthalmic solution Place 1 drop into both eyes 2 times daily.       clotrimazole (LOTRIMIN) 1 % external cream Apply topically 2 times daily.       DULoxetine (CYMBALTA) 60 MG capsule Take 60 mg by mouth daily.       furosemide (LASIX) 20 MG tablet Take 20 mg by mouth daily.       levothyroxine (SYNTHROID/LEVOTHROID) 137 MCG tablet Take 137 mcg by mouth daily.       Lidocaine (LIDOCARE) 4 % Patch Place 1 patch onto the skin every 24 hours. To prevent lidocaine toxicity, patient should be patch free for 12 hrs daily.       loperamide (IMODIUM) 2 MG capsule Take 2 mg by mouth 2 times daily as needed for diarrhea.       metoprolol succinate ER (TOPROL XL) 100 MG 24 hr tablet Take 100 mg by mouth daily.       mirabegron (MYRBETRIQ) 25 MG 24 hr tablet Take 25 mg by mouth daily.        "omeprazole (PRILOSEC) 20 MG DR capsule Take 20 mg by mouth daily.       potassium chloride ER (K-TAB) 20 MEQ CR tablet Take 10 mEq by mouth daily.       Skin Protectants, Misc. (HYDROCERIN EX) Externally apply topically daily as needed.       Vitamin D3 (CHOLECALCIFEROL) 25 mcg (1000 units) tablet Take 25 mcg by mouth daily.       White Petrolatum-Mineral Oil (SOOTHE NIGHTTIME OP) Apply to eye. Instill 1 application in both  eyes at bedtime for . Apply a thin line into both eyes at  bedtime       No current facility-administered medications for this visit.       ROS:  Limited secondary to cognitive impairment but today pt reports no acute concerns    Vitals:  /63   Pulse 64   Temp 97.9  F (36.6  C)   Resp 16   Ht 1.676 m (5' 6\")   Wt 100 kg (220 lb 8 oz)   SpO2 91%   BMI 35.59 kg/m    Exam:  Pleasant, obese female, sitting in chair in her room.  She appears well and is in good spirits  HEENT: Edentulous  Oral mucosa moist  Lungs: Respiratory rate 12, unlabored.  Bibasilar crackles  CV irregular, heart rate 60s  Abdomen soft, protuberant  Extremities: No edema  Neuro: Alert, pleasant, oriented to self.  Is unable to recall any details of recent hospitalization.  No tremors.  No focal weakness.  Gait was not assessed by me.    Lab/Diagnostic data:  Most Recent 3 CBC's:  Recent Labs   Lab Test 11/21/24  0533 12/10/18  0624 12/09/18  0510   WBC 5.9 5.9 6.3   HGB 13.5 12.5 12.6   * 103* 103*    231 244     Most Recent 3 BMP's:  Recent Labs   Lab Test 11/21/24  0533 12/10/18  0624 12/09/18  0510    140 141   POTASSIUM 4.3 4.4 4.3   CHLORIDE 100 105 104   CO2 28 25 25   BUN 17.3 21 20   CR 1.01* 1.30* 1.35*   ANIONGAP 13 10 12   RAYO 10.0 9.9 9.6   * 86 87     Most Recent 2 LFT's:No lab results found.      ASSESSMENT/PLAN:    Pneumonia, likely aspiration, in the setting of achalasia, underlying cognitive impairment  Status post intubation and course of antibiotics.  Status post Botox " esophageal injection  Respiratory status appears stable.  Patient denies swallowing difficulties  Plan: Therapies.  Monitor swallowing function, continue PPI.  Monitor respiratory status and swallowing function.    Dementia with delirium in the setting of acute illness  Patient currently is calm, pleasant though very confused  Unclear if this approximates her recent baseline  Plan: Therapies, monitor mental and functional status.  Should patient experience recurrent significant aspiration, she might be a candidate for hospice    Chronic atrial fibrillation  Hypertension  History of heart failure, combined systolic and diastolic  CV status appears stable  Plan: Continue apixaban, statin, low-dose furosemide, metoprolol.  Monitor exam, BMP      Dave Guzman MD      Sincerely,        Dave Guzman MD

## 2024-11-21 NOTE — TELEPHONE ENCOUNTER
Writer confirmed with TCU nurse that patient does not have any orders for Seroquel or opioids.        New orders per Dr. Guzman:    -Obtain UA/UC  -Seroquel 12.5mg BID dx:  delirium-----update family  Senna S 2 tabs daily  Bisacodyl suppository 10mg daily PRN      Orders given to:  Lorraine Garduno RN

## 2024-11-21 NOTE — TELEPHONE ENCOUNTER
Lets check a [facility].  I believe she has Seroquel ordered as she has a history of dementia with psychosis. Has she received Seroquel? Has she received any opioids recently?

## 2024-11-23 LAB — BACTERIA UR CULT: ABNORMAL

## 2024-11-25 ENCOUNTER — TRANSITIONAL CARE UNIT VISIT (OUTPATIENT)
Dept: GERIATRICS | Facility: CLINIC | Age: 84
End: 2024-11-25
Payer: COMMERCIAL

## 2024-11-25 VITALS
WEIGHT: 216.6 LBS | BODY MASS INDEX: 34.81 KG/M2 | SYSTOLIC BLOOD PRESSURE: 146 MMHG | TEMPERATURE: 97.7 F | RESPIRATION RATE: 19 BRPM | HEART RATE: 63 BPM | HEIGHT: 66 IN | DIASTOLIC BLOOD PRESSURE: 80 MMHG | OXYGEN SATURATION: 95 %

## 2024-11-25 DIAGNOSIS — J69.0 ASPIRATION PNEUMONIA, UNSPECIFIED ASPIRATION PNEUMONIA TYPE, UNSPECIFIED LATERALITY, UNSPECIFIED PART OF LUNG (H): ICD-10-CM

## 2024-11-25 DIAGNOSIS — K22.0 ACHALASIA: ICD-10-CM

## 2024-11-25 DIAGNOSIS — R53.81 PHYSICAL DECONDITIONING: ICD-10-CM

## 2024-11-25 DIAGNOSIS — F03.90 DEMENTIA, UNSPECIFIED DEMENTIA SEVERITY, UNSPECIFIED DEMENTIA TYPE, UNSPECIFIED WHETHER BEHAVIORAL, PSYCHOTIC, OR MOOD DISTURBANCE OR ANXIETY (H): Primary | ICD-10-CM

## 2024-11-25 DIAGNOSIS — I50.42 CHRONIC COMBINED SYSTOLIC AND DIASTOLIC CONGESTIVE HEART FAILURE (H): ICD-10-CM

## 2024-11-25 DIAGNOSIS — G89.29 OTHER CHRONIC PAIN: ICD-10-CM

## 2024-11-25 DIAGNOSIS — I10 BENIGN ESSENTIAL HYPERTENSION: ICD-10-CM

## 2024-11-25 DIAGNOSIS — I48.20 CHRONIC ATRIAL FIBRILLATION (H): ICD-10-CM

## 2024-11-25 PROCEDURE — 99309 SBSQ NF CARE MODERATE MDM 30: CPT | Performed by: NURSE PRACTITIONER

## 2024-11-25 NOTE — PROGRESS NOTES
"[facility] GERIATRICS    Chief Complaint   Patient presents with    RECHECK     HPI:  Carmelina Gaspar is a 84 year old  (1940), who is being seen today for an episodic care visit at: [facility] ([facility]) [[id number]]. Today's concern is:     Carmelina was visited today while in her room sitting up in the wheelchair.  She shares that she has not been sleeping well here at the [facility].  She experienced painful urination over the weekend therefore urinalysis was completed and was negative for infection.  Appetite has been good and she has not noted any difficulty chewing or swallowing.  She intermittently has shortness of breath but feels that this is her baseline.  No complaints of chest pain or cough.  Had a bowel movement yesterday morning.    Allergies, and PMH/PSH reviewed in [facility] today.  REVIEW OF SYSTEMS:  4 point ROS including Respiratory, CV, GI and , other than that noted in the HPI,  is negative    Objective:   BP (!) 146/80   Pulse 63   Temp 97.7  F (36.5  C)   Resp 19   Ht 1.676 m (5' 6\")   Wt 98.2 kg (216 lb 9.6 oz)   SpO2 95%   BMI 34.96 kg/m    GENERAL APPEARANCE:  Alert, in no distress, pleasant, cooperative  EYES: no discharge or mattering on lids or lashes noted  ENT:  moist mucous membranes, hearing acuity intact  NECK: supple, symmetrical  RESP: no respiratory distress, Lung sounds clear, patient is on room air  CV:  rate and rhythm regular, no murmur. Edema trace in bilateral lower extremities. VASCULAR: warm extremities without open areas.  ABDOMEN: normal bowel sounds, soft, nontender.  M/S:   Gait and station: requiring assistance with all cares. , no tenderness or swelling of the joints; able to move all extremities   SKIN:  Inspection and palpation of skin and subcutaneous tissue: skin warm, dry and intact without rashes  NEURO: no facial asymmetry, no speech deficits and able to follow directions, moves all extremities symmetrically  PSYCH:  insight, judgement, and memory " impaired affect and mood normal      Recent labs reviewed in epic  CBC RESULTS:   Recent Labs   Lab Test 11/21/24  0533 12/10/18  0624   WBC 5.9 5.9   RBC 3.91 3.91   HGB 13.5 12.5   HCT 40.7 40.2   * 103*   MCH 34.5* 32.0   MCHC 33.2 31.1*   RDW 12.8 15.2*    231       Last Basic Metabolic Panel:  Recent Labs   Lab Test 11/21/24  0533 12/10/18  0624    140   POTASSIUM 4.3 4.4   CHLORIDE 100 105   RAYO 10.0 9.9   CO2 28 25   BUN 17.3 21   CR 1.01* 1.30*   * 86         TSH   Date Value Ref Range Status   06/15/2013 2.75 0.4 - 5.0 mU/L Final       Assessment/Plan:  Aspiration pneumonia  Secondary to achalasia.  Required intubation x 3 days. Treated with ceftriaxone.  No signs or symptoms of recurrent pneumonia  -- Monitor respiratory status    Dementia  Currently lives in an assisted living.  Had worsening delirium after EGD but cleared after gabapentin, melatonin, and Seroquel were held.  None were restarted at discharge but she had increased confusion on 11/21 therefore her Seroquel was restarted at 12.5 mg BID and that has been very helpful.  She appears calm and pleasant today.  --continue with 24/7 nursing on secure unit. OT to administer cognitive testing.  --return to AL upon completion of therapy.    Achalasia   s/p botilnum toxin (2/2019, 5/2021,11.2024)   EGD was completed 11/07/2024, which demonstrated mildly hypertonic LES, consistent with known history of achalasia which was injected with botulinum toxin.   - Continue with Omeprazole daily  - Repeat EGD PRN for re-treatment if symptoms recure.  --SLP following in house.     CAD s/p CABG 2005  HFrEF (EF 35%)  HTN  Chronic a-fib  Hx TIA  ADALID Vas of 9 Points with 17% risk of stroke /TIA/ or embolus. -150s, HR 60-80s, euvolemic on exam.  --continue with metoprolol 100 mg daily, lasix 20 mg daily, potassium 10 mg daily, statin, eliquis 5 mg bid  - Continue to monitor blood pressure and heart rate, adjust medications as  needed.    Chronic pain  No complaints of pain today.  Previously on gabapentin which was stopped due to altered mental status.  -- Continue with Tylenol arthritis 3 times a day  -- Cymbalta 60 mg daily.    Physical deconditioning  Secondary to recent hospitalization and underlying medical conditions.   --ongoing PT/OT for strengthening.     MED REC REQUIRED  Post Medication Reconciliation Status: medication reconcilation previously completed during another office visit      Electronically signed by: VISH Ortega CNP

## 2024-11-25 NOTE — LETTER
" 11/25/2024      Carmelina Gaspar  [address]  [facility] [address]        [facility] GERIATRICS    Chief Complaint   Patient presents with     RECHECK     HPI:  Carmelina Gaspar is a 84 year old  (1940), who is being seen today for an episodic care visit at: [facility] ([facility]) [[id number]]. Today's concern is:     Carmelina was visited today while in her room sitting up in the wheelchair.  She shares that she has not been sleeping well here at the [facility].  She experienced painful urination over the weekend therefore urinalysis was completed and was negative for infection.  Appetite has been good and she has not noted any difficulty chewing or swallowing.  She intermittently has shortness of breath but feels that this is her baseline.  No complaints of chest pain or cough.  Had a bowel movement yesterday morning.    Allergies, and PMH/PSH reviewed in EPIC today.  REVIEW OF SYSTEMS:  4 point ROS including Respiratory, CV, GI and , other than that noted in the HPI,  is negative    Objective:   BP (!) 146/80   Pulse 63   Temp 97.7  F (36.5  C)   Resp 19   Ht 1.676 m (5' 6\")   Wt 98.2 kg (216 lb 9.6 oz)   SpO2 95%   BMI 34.96 kg/m    GENERAL APPEARANCE:  Alert, in no distress, pleasant, cooperative  EYES: no discharge or mattering on lids or lashes noted  ENT:  moist mucous membranes, hearing acuity intact  NECK: supple, symmetrical  RESP: no respiratory distress, Lung sounds clear, patient is on room air  CV:  rate and rhythm regular, no murmur. Edema trace in bilateral lower extremities. VASCULAR: warm extremities without open areas.  ABDOMEN: normal bowel sounds, soft, nontender.  M/S:   Gait and station: requiring assistance with all cares. , no tenderness or swelling of the joints; able to move all extremities   SKIN:  Inspection and palpation of skin and subcutaneous tissue: skin warm, dry and intact without rashes  NEURO: no facial asymmetry, no speech deficits and able to follow " directions, moves all extremities symmetrically  PSYCH:  insight, judgement, and memory impaired affect and mood normal      Recent labs reviewed in epic  CBC RESULTS:   Recent Labs   Lab Test 11/21/24  0533 12/10/18  0624   WBC 5.9 5.9   RBC 3.91 3.91   HGB 13.5 12.5   HCT 40.7 40.2   * 103*   MCH 34.5* 32.0   MCHC 33.2 31.1*   RDW 12.8 15.2*    231       Last Basic Metabolic Panel:  Recent Labs   Lab Test 11/21/24  0533 12/10/18  0624    140   POTASSIUM 4.3 4.4   CHLORIDE 100 105   RAYO 10.0 9.9   CO2 28 25   BUN 17.3 21   CR 1.01* 1.30*   * 86         TSH   Date Value Ref Range Status   06/15/2013 2.75 0.4 - 5.0 mU/L Final       Assessment/Plan:  Aspiration pneumonia  Secondary to achalasia.  Required intubation x 3 days. Treated with ceftriaxone.  No signs or symptoms of recurrent pneumonia  -- Monitor respiratory status    Dementia  Currently lives in an assisted living.  Had worsening delirium after EGD but cleared after gabapentin, melatonin, and Seroquel were held.  None were restarted at discharge but she had increased confusion on 11/21 therefore her Seroquel was restarted at 12.5 mg BID and that has been very helpful.  She appears calm and pleasant today.  --continue with 24/7 nursing on secure unit. OT to administer cognitive testing.  --return to AL upon completion of therapy.    Achalasia   s/p botilnum toxin (2/2019, 5/2021,11.2024)   EGD was completed 11/07/2024, which demonstrated mildly hypertonic LES, consistent with known history of achalasia which was injected with botulinum toxin.   - Continue with Omeprazole daily  - Repeat EGD PRN for re-treatment if symptoms recure.  --SLP following in house.     CAD s/p CABG 2005  HFrEF (EF 35%)  HTN  Chronic a-fib  Hx TIA  ADALID Vas of 9 Points with 17% risk of stroke /TIA/ or embolus. -150s, HR 60-80s, euvolemic on exam.  --continue with metoprolol 100 mg daily, lasix 20 mg daily, potassium 10 mg daily, statin, eliquis 5 mg  bid  - Continue to monitor blood pressure and heart rate, adjust medications as needed.    Chronic pain  No complaints of pain today.  Previously on gabapentin which was stopped due to altered mental status.  -- Continue with Tylenol arthritis 3 times a day  -- Cymbalta 60 mg daily.    Physical deconditioning  Secondary to recent hospitalization and underlying medical conditions.   --ongoing PT/OT for strengthening.     MED REC REQUIRED  Post Medication Reconciliation Status: medication reconcilation previously completed during another office visit      Electronically signed by: VISH Ortega CNP           Sincerely,        VISH Ortega CNP

## 2024-12-05 ENCOUNTER — TRANSITIONAL CARE UNIT VISIT (OUTPATIENT)
Dept: GERIATRICS | Facility: CLINIC | Age: 84
End: 2024-12-05
Payer: COMMERCIAL

## 2024-12-05 VITALS
WEIGHT: 216.6 LBS | TEMPERATURE: 97.7 F | BODY MASS INDEX: 34.96 KG/M2 | SYSTOLIC BLOOD PRESSURE: 120 MMHG | DIASTOLIC BLOOD PRESSURE: 77 MMHG | HEART RATE: 72 BPM | RESPIRATION RATE: 19 BRPM | OXYGEN SATURATION: 90 %

## 2024-12-05 NOTE — PROGRESS NOTES
[facility] GERIATRICS    Chief Complaint   Patient presents with    RECHECK     HPI:  Carmelina Gaspar is a 84 year old  (1940), who is being seen today for an episodic care visit at: [facility] ([facility]) [[id number]]. Today's concern is: ***    Allergies, and PMH/PSH reviewed in [facility] today.  REVIEW OF SYSTEMS:  {lnqlxt63:164542}    Objective:   /77   Pulse 72   Temp 97.7  F (36.5  C)   Resp 19   Wt 98.2 kg (216 lb 9.6 oz)   SpO2 90%   BMI 34.96 kg/m    {Nursing home physical exam :891135}    {fgslab:298675}    Assessment/Plan:  {S DX2:597686}    MED REC REQUIRED{TIP  Click the link below to document or use med rec list, use list to pull in response :228550}  Post Medication Reconciliation Status: {MED REC LIST:679516}      Orders:  {fgsorders:599349}  ***    Electronically signed by: Rodrigo Trujillo ***

## 2024-12-05 NOTE — LETTER
12/5/2024      Carmelina Gaspar  [address]  [facility] [address]        No notes on file      Sincerely,        Kinza Alvarenga, VISH CNP

## 2024-12-10 ENCOUNTER — DISCHARGE SUMMARY NURSING HOME (OUTPATIENT)
Dept: GERIATRICS | Facility: CLINIC | Age: 84
End: 2024-12-10
Payer: COMMERCIAL

## 2024-12-10 VITALS
OXYGEN SATURATION: 94 % | BODY MASS INDEX: 37.61 KG/M2 | HEART RATE: 73 BPM | DIASTOLIC BLOOD PRESSURE: 88 MMHG | SYSTOLIC BLOOD PRESSURE: 155 MMHG | RESPIRATION RATE: 18 BRPM | TEMPERATURE: 97.7 F | WEIGHT: 233 LBS

## 2024-12-10 DIAGNOSIS — F03.90 DEMENTIA, UNSPECIFIED DEMENTIA SEVERITY, UNSPECIFIED DEMENTIA TYPE, UNSPECIFIED WHETHER BEHAVIORAL, PSYCHOTIC, OR MOOD DISTURBANCE OR ANXIETY (H): Primary | ICD-10-CM

## 2024-12-10 DIAGNOSIS — G89.29 OTHER CHRONIC PAIN: ICD-10-CM

## 2024-12-10 DIAGNOSIS — I48.20 CHRONIC ATRIAL FIBRILLATION (H): ICD-10-CM

## 2024-12-10 DIAGNOSIS — K22.0 ACHALASIA: ICD-10-CM

## 2024-12-10 DIAGNOSIS — I50.42 CHRONIC COMBINED SYSTOLIC AND DIASTOLIC CONGESTIVE HEART FAILURE (H): ICD-10-CM

## 2024-12-10 DIAGNOSIS — I10 BENIGN ESSENTIAL HYPERTENSION: ICD-10-CM

## 2024-12-10 PROCEDURE — 99316 NF DSCHRG MGMT 30 MIN+: CPT | Performed by: NURSE PRACTITIONER

## 2024-12-10 NOTE — PROGRESS NOTES
[facility] GERIATRICS DISCHARGE SUMMARY  PATIENT'S NAME: Carmelina Gaspar  YOB: 1940  MEDICAL RECORD NUMBER:  [id number]  Place of Service where encounter took place:  [facility] ([facility]) [[id number]]    PRIMARY CARE PROVIDER AND CLINIC RESPONSIBLE AFTER TRANSFER:   Deidra Dick NP, [address] [address]     Transferring providers: VISH Orteag CNP, Dave Guzman MD  Recent Hospitalization/ED:  Hospital  [facility] [place] 11/2/2024 to 11/12/2024.  Date of SNF Admission: November 12, 2024  Date of SNF (anticipated) Discharge: 12/11/2024  Discharged to: new assisted living for patient [facility]  Cognitive Scores: SLUMS: 15/30 (11/13/24) Barthel: 10/100 (11/13/24)  Physical Function:   Transfers Toilet transfer = Supervision or touching assistance  Car transfer = Not attempted due to environmental limitations  Ambulation Walk 10 Feet = Supervision or touching assistance  Walk 50 Feet with Two Turns = Supervision or touching assistance  Walk 150 feet = Not applicable  Walking 10 feet on uneven surfaces = Not applicable  Other Ambulation   Tasks  1 step (curb) = Not applicable  Other Picking up object = Not applicable  Wheelchair   Mobility/Management  Does the resident use a wheelchair and/or scooter? = Yes  Wheel 50 feet with two turns = Independent  Indicate the type of wheelchair or scooter used = Manual  Wheel 150 feet = Not applicable  Indicate the type of wheelchair or scooter used = Manual  Eating Eating = Independent  Hygiene / Bathing Oral hygiene = Independent  Toileting hygiene = Partial/moderate assistance  Shower/bathe self = Substantial/maximal assistance  Dressing Upper body dressing = Partial/moderate assistance  Lower body dressing = Substantial/maximal assistance  Putting on/taking off footwear = Substantial/maximal assistance  DME: No DME needed    CODE STATUS/ADVANCE DIRECTIVES DISCUSSION:  No CPR- Do NOT Intubate   ALLERGIES: Gabapentin,  Levofloxacin, Etodolac, and Oxycodone    NURSING FACILITY COURSE   Carmelina Gaspar  is a 84 year old  (1940) female with a medical history of achlasia with dilation, ischemic cardiomyopathy, atrial fibrillation, chronic low back pain, and type 2 diabetes.,  She currently lives at an assisted living facility where staff noted worsening mentation as well as a gurgling sound.  [place] [facility] [place] were concerns for aspiration pneumonia.  She was intubated through 11/5/2024, treated with ceftriaxone. Had an EGD for Botox injection on 11 7 and following that had significant delirium which resolved by discharge.  Palliative care was involved throughout hospitalization given the progressing dementia.  Unfortunately did not meet the hospice requirement but should she show further signs of decline, interested in pursuing hospice.  admitted to the above facility from  [facility] Hospital stay 11/2/2024 through 11/12/2024.     Dementia  Currently lives in an assisted living.  Had worsening delirium after EGD but cleared after gabapentin, melatonin, and Seroquel were held.  None were restarted at discharge but she had increased confusion on 11/21 therefore her Seroquel was restarted at 12.5 mg BID and that has been very helpful.  She appears calm and pleasant today. SLUMS: 15/30 (11/13/24) Barthel: 10/100 (11/13/24)  -- Will be moving to new assisted living from the TCU here     Achalasia   s/p botilnum toxin (2/2019, 5/2021,11.2024)   EGD was completed 11/07/2024, which demonstrated mildly hypertonic LES, consistent with known history of achalasia which was injected with botulinum toxin. No further issues while here at the TCU  - Continue with Omeprazole daily  - Repeat EGD PRN for re-treatment if symptoms recure.  --SLP following in house.     CAD s/p CABG 2005  HFrEF (EF 35%)  HTN  Chronic a-fib  Hx TIA  ADALID Vas of 9 Points with 17% risk of stroke /TIA/ or embolus. -130s, HR 60-80s, euvolemic on  exam.  --continue with metoprolol 100 mg daily, lasix 20 mg daily, potassium 10 mg daily, statin, eliquis 5 mg bid  - Continue to monitor blood pressure and heart rate, adjust medications as needed.    Chronic pain  No complaints of pain today.  Previously on gabapentin which was stopped due to altered mental status.  -- Continue with Tylenol arthritis 3 times a day  -- Cymbalta 60 mg daily.     Aspiration pneumonia  Secondary to achalasia.  Required intubation x 3 days. Treated with ceftriaxone.  No signs or symptoms of recurrent pneumonia  -- Monitor respiratory status     Physical deconditioning  Secondary to recent hospitalization and underlying medical conditions.   --ongoing PT/OT for strengthening.    Discharge Medications:  MED REC REQUIRED  Post Medication Reconciliation Status: medication reconcilation previously completed during another office visit     Current Outpatient Medications   Medication Sig Dispense Refill    acetaminophen (TYLENOL) 325 MG tablet Take 650 mg by mouth 3 times daily.      albuterol (PROAIR HFA/PROVENTIL HFA/VENTOLIN HFA) 108 (90 Base) MCG/ACT inhaler Inhale 2 puffs into the lungs every 4 hours as needed for shortness of breath, wheezing or cough      apixaban ANTICOAGULANT (ELIQUIS) 5 MG tablet Take 5 mg by mouth 2 times daily      atorvastatin (LIPITOR) 20 MG tablet Take 20 mg by mouth At Bedtime      bisacodyl (DULCOLAX) 10 MG suppository Place 10 mg rectally daily as needed for constipation.      carboxymethylcellulose PF (REFRESH PLUS) 0.5 % ophthalmic solution Place 1 drop into both eyes 2 times daily.      clotrimazole (LOTRIMIN) 1 % external cream Apply topically 2 times daily.      DULoxetine (CYMBALTA) 60 MG capsule Take 60 mg by mouth daily.      furosemide (LASIX) 20 MG tablet Take 20 mg by mouth daily.      levothyroxine (SYNTHROID/LEVOTHROID) 137 MCG tablet Take 137 mcg by mouth daily.      Lidocaine (LIDOCARE) 4 % Patch Place 1 patch onto the skin every 24 hours. To  prevent lidocaine toxicity, patient should be patch free for 12 hrs daily.      loperamide (IMODIUM) 2 MG capsule Take 2 mg by mouth 2 times daily as needed for diarrhea.      metoprolol succinate ER (TOPROL XL) 100 MG 24 hr tablet Take 100 mg by mouth daily.      mirabegron (MYRBETRIQ) 25 MG 24 hr tablet Take 25 mg by mouth daily.      omeprazole (PRILOSEC) 20 MG DR capsule Take 20 mg by mouth daily.      potassium chloride ER (K-TAB) 20 MEQ CR tablet Take 10 mEq by mouth daily.      QUEtiapine (SEROQUEL) 25 MG tablet Take 12.5 mg by mouth 2 times daily.      senna-docusate (SENOKOT-S/PERICOLACE) 8.6-50 MG tablet Take 2 tablets by mouth daily.      Skin Protectants, Misc. (HYDROCERIN EX) Externally apply topically daily as needed.      Vitamin D3 (CHOLECALCIFEROL) 25 mcg (1000 units) tablet Take 25 mcg by mouth daily.      White Petrolatum-Mineral Oil (SOOTHE NIGHTTIME OP) Apply to eye. Instill 1 application in both  eyes at bedtime for . Apply a thin line into both eyes at  bedtime          Controlled medications:   not applicable/none     Past Medical History:   Past Medical History:   Diagnosis Date    Atrial fibrillation (H)     CAD (coronary artery disease)     Central retinal vein occlusion of right eye (H)     CKD (chronic kidney disease) stage 3, GFR 30-59 ml/min (H)     Dementia (H)     Depression     Hypothyroidism     Ischemic cardiomyopathy     Urinary incontinence      Physical Exam:   Vitals: BP (!) 155/88   Pulse 73   Temp 97.7  F (36.5  C)   Resp 18   Wt 105.7 kg (233 lb)   SpO2 94%   BMI 37.61 kg/m    BMI: Body mass index is 37.61 kg/m .  GENERAL APPEARANCE:  Alert, in no distress, pleasant, cooperative  EYES: no discharge or mattering on lids or lashes noted  ENT:  moist mucous membranes, hearing acuity intact  NECK: supple, symmetrical  RESP: no respiratory distress, Lung sounds clear, patient is on room air  CV:  rate and rhythm regular, no murmur. Edema trace in bilateral lower  extremities. VASCULAR: warm extremities without open areas.  ABDOMEN: normal bowel sounds, soft, nontender.  M/S:   Gait and station: requiring assistance with all cares., no tenderness or swelling of the joints; able to move all extremities   SKIN:  Inspection and palpation of skin and subcutaneous tissue: skin warm, dry and intact without rashes  NEURO: no facial asymmetry, no speech deficits and able to follow directions, moves all extremities symmetrically  PSYCH:  insight, judgement, and memory impaired affect and mood normal    SNF labs:   CBC RESULTS:   Recent Labs   Lab Test 11/21/24  0533 12/10/18  0624   WBC 5.9 5.9   RBC 3.91 3.91   HGB 13.5 12.5   HCT 40.7 40.2   * 103*   MCH 34.5* 32.0   MCHC 33.2 31.1*   RDW 12.8 15.2*    231       Last Basic Metabolic Panel:  Recent Labs   Lab Test 11/21/24  0533 12/10/18  0624    140   POTASSIUM 4.3 4.4   CHLORIDE 100 105   RAYO 10.0 9.9   CO2 28 25   BUN 17.3 21   CR 1.01* 1.30*   * 86       TSH   Date Value Ref Range Status   06/15/2013 2.75 0.4 - 5.0 mU/L Final     DISCHARGE PLAN:  Follow up labs: No labs orders/due  Medical Follow Up:      Follow up with primary care provider in 1-2 weeks  [facility] scheduled appointments:  Appointments in Next Year      Dec 10, 2024 9:00 AM  Discharge Summary with VISH Garvin CNP  [facility] ([facility] ) [phone]          Discharge Services: Home Care:  Occupational Therapy and Physical Therapy  Discharge Instructions Verbalized to Patient at Discharge:   None    TOTAL DISCHARGE TIME:   Greater than 30 minutes  Electronically signed by:  VISH Ortega CNP         Documentation of Face-to-Face and Certification for Home Health Services     Patient: Carmelina Gaspar   YOB: 1940  MR Number: [id number]  Today's Date: 12/11/2024    I certify that patient: Carmelina Gaspar is under my care and that I, or a nurse  practitioner or physician's assistant working with me, had a face-to-face encounter that meets the physician face-to-face encounter requirements with this patient on: 12/10/2024.    This encounter with the patient was in whole, or in part, for the following medical condition, which is the primary reason for home health care: Dementia and aspiration pneumonia.    I certify that, based on my findings, the following services are medically necessary home health services: Occupational Therapy and Physical Therapy.    My clinical findings support the need for the above services because: Occupational Therapy Services are needed to assess and treat cognitive ability and address ADL safety due to impairment in upper body strength.  Please assess new apartment for DME needs. and Physical Therapy Services are needed to assess and treat the following functional impairments: Continue to work on transfer training and staff training.    Further, I certify that my clinical findings support that this patient is homebound (i.e. absences from home require considerable and taxing effort and are for medical reasons or [employer] services or infrequently or of short duration when for other reasons) because: Requires assistance of another person or specialized equipment to access medical services because patient: Is prone to wander/get lost without assistance., Range of motion limitations prevents ability to exit home safely., and Requires supervision of another for safe transfer...    Based on the above findings. I certify that this patient is confined to the home and needs intermittent skilled nursing care, physical therapy and/or speech therapy.  The patient is under my care, and I have initiated the establishment of the plan of care.  This patient will be followed by a physician who will periodically review the plan of care.  Physician/Provider to provide follow up care: Deidra Dick    Responsible Medicare certified [place]AR[place]  Physician: Dr. Dave Guzman  Physician Signature: See electronic signature associated with these discharge orders.  Date: 12/11/2024

## 2024-12-10 NOTE — LETTER
12/10/2024      Carmelina Gaspar  [address]  [facility] [address]        [facility] GERIATRICS DISCHARGE SUMMARY  PATIENT'S NAME: Carmelina Gaspar  YOB: 1940  MEDICAL RECORD NUMBER:  [id number]  Place of Service where encounter took place:  [facility] (TCU) [[id number]]    PRIMARY CARE PROVIDER AND CLINIC RESPONSIBLE AFTER TRANSFER:   Deidra Dick NP, [address] / [facility] [address]     Transferring providers: VISH Ortega CNP, Dave Guzman MD  Recent Hospitalization/ED:  [place]  [facility] [place] 11/12/2024.  Date of SNF Admission: November 12, 2024  Date of SNF (anticipated) Discharge: 12/11/2024  Discharged to: new assisted living for patient [facility], [facility]  Cognitive Scores: SLUMS: 15/30 (11/13/24) Barthel: 10/100 (11/13/24)  Physical Function:   Transfers Toilet transfer = Supervision or touching assistance  Car transfer = Not attempted due to environmental limitations  Ambulation Walk 10 Feet = Supervision or touching assistance  Walk 50 Feet with Two Turns = Supervision or touching assistance  Walk 150 feet = Not applicable  Walking 10 feet on uneven surfaces = Not applicable  Other Ambulation   Tasks  1 step (curb) = Not applicable  Other Picking up object = Not applicable  Wheelchair   Mobility/Management  Does the resident use a wheelchair and/or scooter? = Yes  Wheel 50 feet with two turns = Independent  Indicate the type of wheelchair or scooter used = Manual  Wheel 150 feet = Not applicable  Indicate the type of wheelchair or scooter used = Manual  Eating Eating = Independent  Hygiene / Bathing Oral hygiene = Independent  Toileting hygiene = Partial/moderate assistance  Shower/bathe self = Substantial/maximal assistance  Dressing Upper body dressing = Partial/moderate assistance  Lower body dressing = Substantial/maximal assistance  Putting on/taking off footwear = Substantial/maximal assistance  DME: No DME  needed    CODE STATUS/ADVANCE DIRECTIVES DISCUSSION:  No CPR- Do NOT Intubate   ALLERGIES: Gabapentin, Levofloxacin, Etodolac, and Oxycodone    NURSING FACILITY COURSE   Carmelina Gaspar  is a 84 year old  (1940) female with a medical history of achlasia with dilation, ischemic cardiomyopathy, atrial fibrillation, chronic low back pain, and type 2 diabetes.,  She currently lives at an assisted living facility where staff noted worsening mentation as well as a gurgling sound.  She was brought to [facility] where there were concerns for aspiration pneumonia.  She was intubated through 11/5/2024, treated with ceftriaxone. Had an EGD for Botox injection on 11 7 and following that had significant delirium which resolved by discharge.  Palliative care was involved throughout hospitalization given the progressing dementia.  Unfortunately did not meet the hospice requirement but should she show further signs of decline, interested in pursuing hospice.  admitted to the above facility from  [facility]. Hospital stay 11/2/2024 through 11/12/2024.     Dementia  Currently lives in an assisted living.  Had worsening delirium after EGD but cleared after gabapentin, melatonin, and Seroquel were held.  None were restarted at discharge but she had increased confusion on 11/21 therefore her Seroquel was restarted at 12.5 mg BID and that has been very helpful.  She appears calm and pleasant today. SLUMS: 15/30 (11/13/24) Barthel: 10/100 (11/13/24)  -- Will be moving to new assisted living from the TCU here     Achalasia   s/p botilnum toxin (2/2019, 5/2021,11.2024)   EGD was completed 11/07/2024, which demonstrated mildly hypertonic LES, consistent with known history of achalasia which was injected with botulinum toxin. No further issues while here at the TCU  - Continue with Omeprazole daily  - Repeat EGD PRN for re-treatment if symptoms recure.  --SLP following in house.     CAD s/p CABG 2005  HFrEF (EF 35%)  HTN  Chronic a-fib  Hx TIA  ADALID  Vas of 9 Points with 17% risk of stroke /TIA/ or embolus. -130s, HR 60-80s, euvolemic on exam.  --continue with metoprolol 100 mg daily, lasix 20 mg daily, potassium 10 mg daily, statin, eliquis 5 mg bid  - Continue to monitor blood pressure and heart rate, adjust medications as needed.    Chronic pain  No complaints of pain today.  Previously on gabapentin which was stopped due to altered mental status.  -- Continue with Tylenol arthritis 3 times a day  -- Cymbalta 60 mg daily.     Aspiration pneumonia  Secondary to achalasia.  Required intubation x 3 days. Treated with ceftriaxone.  No signs or symptoms of recurrent pneumonia  -- Monitor respiratory status     Physical deconditioning  Secondary to recent hospitalization and underlying medical conditions.   --ongoing PT/OT for strengthening.    Discharge Medications:  MED REC REQUIRED  Post Medication Reconciliation Status: medication reconcilation previously completed during another office visit     Current Outpatient Medications   Medication Sig Dispense Refill     acetaminophen (TYLENOL) 325 MG tablet Take 650 mg by mouth 3 times daily.       albuterol (PROAIR HFA/PROVENTIL HFA/VENTOLIN HFA) 108 (90 Base) MCG/ACT inhaler Inhale 2 puffs into the lungs every 4 hours as needed for shortness of breath, wheezing or cough       apixaban ANTICOAGULANT (ELIQUIS) 5 MG tablet Take 5 mg by mouth 2 times daily       atorvastatin (LIPITOR) 20 MG tablet Take 20 mg by mouth At Bedtime       bisacodyl (DULCOLAX) 10 MG suppository Place 10 mg rectally daily as needed for constipation.       carboxymethylcellulose PF (REFRESH PLUS) 0.5 % ophthalmic solution Place 1 drop into both eyes 2 times daily.       clotrimazole (LOTRIMIN) 1 % external cream Apply topically 2 times daily.       DULoxetine (CYMBALTA) 60 MG capsule Take 60 mg by mouth daily.       furosemide (LASIX) 20 MG tablet Take 20 mg by mouth daily.       levothyroxine (SYNTHROID/LEVOTHROID) 137 MCG tablet Take  137 mcg by mouth daily.       Lidocaine (LIDOCARE) 4 % Patch Place 1 patch onto the skin every 24 hours. To prevent lidocaine toxicity, patient should be patch free for 12 hrs daily.       loperamide (IMODIUM) 2 MG capsule Take 2 mg by mouth 2 times daily as needed for diarrhea.       metoprolol succinate ER (TOPROL XL) 100 MG 24 hr tablet Take 100 mg by mouth daily.       mirabegron (MYRBETRIQ) 25 MG 24 hr tablet Take 25 mg by mouth daily.       omeprazole (PRILOSEC) 20 MG DR capsule Take 20 mg by mouth daily.       potassium chloride ER (K-TAB) 20 MEQ CR tablet Take 10 mEq by mouth daily.       QUEtiapine (SEROQUEL) 25 MG tablet Take 12.5 mg by mouth 2 times daily.       senna-docusate (SENOKOT-S/PERICOLACE) 8.6-50 MG tablet Take 2 tablets by mouth daily.       Skin Protectants, Misc. (HYDROCERIN EX) Externally apply topically daily as needed.       Vitamin D3 (CHOLECALCIFEROL) 25 mcg (1000 units) tablet Take 25 mcg by mouth daily.       White Petrolatum-Mineral Oil (SOOTHE NIGHTTIME OP) Apply to eye. Instill 1 application in both  eyes at bedtime for . Apply a thin line into both eyes at  bedtime          Controlled medications:   not applicable/none     Past Medical History:   Past Medical History:   Diagnosis Date     Atrial fibrillation (H)      CAD (coronary artery disease)      Central retinal vein occlusion of right eye (H)      CKD (chronic kidney disease) stage 3, GFR 30-59 ml/min (H)      Dementia (H)      Depression      Hypothyroidism      Ischemic cardiomyopathy      Urinary incontinence      Physical Exam:   Vitals: BP (!) 155/88   Pulse 73   Temp 97.7  F (36.5  C)   Resp 18   Wt 105.7 kg (233 lb)   SpO2 94%   BMI 37.61 kg/m    BMI: Body mass index is 37.61 kg/m .  GENERAL APPEARANCE:  Alert, in no distress, pleasant, cooperative  EYES: no discharge or mattering on lids or lashes noted  ENT:  moist mucous membranes, hearing acuity intact  NECK: supple, symmetrical  RESP: no respiratory  distress, Lung sounds clear, patient is on room air  CV:  rate and rhythm regular, no murmur. Edema trace in bilateral lower extremities. VASCULAR: warm extremities without open areas.  ABDOMEN: normal bowel sounds, soft, nontender.  M/S:   Gait and station: requiring assistance with all cares., no tenderness or swelling of the joints; able to move all extremities   SKIN:  Inspection and palpation of skin and subcutaneous tissue: skin warm, dry and intact without rashes  NEURO: no facial asymmetry, no speech deficits and able to follow directions, moves all extremities symmetrically  PSYCH:  insight, judgement, and memory impaired affect and mood normal    SNF labs:   CBC RESULTS:   Recent Labs   Lab Test 11/21/24  0533 12/10/18  0624   WBC 5.9 5.9   RBC 3.91 3.91   HGB 13.5 12.5   HCT 40.7 40.2   * 103*   MCH 34.5* 32.0   MCHC 33.2 31.1*   RDW 12.8 15.2*    231       Last Basic Metabolic Panel:  Recent Labs   Lab Test 11/21/24  0533 12/10/18  0624    140   POTASSIUM 4.3 4.4   CHLORIDE 100 105   RAYO 10.0 9.9   CO2 28 25   BUN 17.3 21   CR 1.01* 1.30*   * 86       TSH   Date Value Ref Range Status   06/15/2013 2.75 0.4 - 5.0 mU/L Final     DISCHARGE PLAN:  Follow up labs: No labs orders/due  Medical Follow Up:      Follow up with primary care provider in 1-2 weeks  [facility] scheduled appointments:  Appointments in Next Year      Dec 10, 2024 9:00 AM  Discharge Summary with VISH Garvin CNP  [facility] ([facility] ) [phone]          Discharge Services: Home Care:  Occupational Therapy and Physical Therapy  Discharge Instructions Verbalized to Patient at Discharge:   None    TOTAL DISCHARGE TIME:   Greater than 30 minutes  Electronically signed by:  VISH Ortega CNP         Documentation of Face-to-Face and Certification for Home Health Services     Patient: Carmelina Gaspar   YOB: 1940    Number: [id number]  Today's Date: 12/11/2024    I certify that patient: Carmelina Gaspar is under my care and that I, or a nurse practitioner or physician's assistant working with me, had a face-to-face encounter that meets the physician face-to-face encounter requirements with this patient on: 12/10/2024.    This encounter with the patient was in whole, or in part, for the following medical condition, which is the primary reason for home health care: Dementia and aspiration pneumonia.    I certify that, based on my findings, the following services are medically necessary home health services: Occupational Therapy and Physical Therapy.    My clinical findings support the need for the above services because: Occupational Therapy Services are needed to assess and treat cognitive ability and address ADL safety due to impairment in upper body strength.  Please assess new apartment for DME needs. and Physical Therapy Services are needed to assess and treat the following functional impairments: Continue to work on transfer training and staff training.    Further, I certify that my clinical findings support that this patient is homebound (i.e. absences from home require considerable and taxing effort and are for medical reasons or [facility] services or infrequently or of short duration when for other reasons) because: Requires assistance of another person or specialized equipment to access medical services because patient: Is prone to wander/get lost without assistance., Range of motion limitations prevents ability to exit home safely., and Requires supervision of another for safe transfer...    Based on the above findings. I certify that this patient is confined to the home and needs intermittent skilled nursing care, physical therapy and/or speech therapy.  The patient is under my care, and I have initiated the establishment of the plan of care.  This patient will be followed by a physician who will periodically review the  plan of care.  Physician/Provider to provide follow up care: Deidra Dick    Responsible Medicare certified PECOS Physician: Dr. Dave Guzman  Physician Signature: See electronic signature associated with these discharge orders.  Date: 12/11/2024                Sincerely,        VISH Ortega CNP

## 2025-02-20 ENCOUNTER — LAB REQUISITION (OUTPATIENT)
Dept: LAB | Facility: CLINIC | Age: 85
End: 2025-02-20
Payer: COMMERCIAL

## 2025-02-20 DIAGNOSIS — E08.21 DIABETES MELLITUS DUE TO UNDERLYING CONDITION WITH DIABETIC NEPHROPATHY (H): ICD-10-CM

## 2025-02-20 DIAGNOSIS — K21.9 GASTRO-ESOPHAGEAL REFLUX DISEASE WITHOUT ESOPHAGITIS: ICD-10-CM

## 2025-02-20 DIAGNOSIS — E03.9 HYPOTHYROIDISM, UNSPECIFIED: ICD-10-CM

## 2025-02-20 DIAGNOSIS — Z71.89 OTHER SPECIFIED COUNSELING: ICD-10-CM

## 2025-02-20 DIAGNOSIS — I50.22 CHRONIC SYSTOLIC (CONGESTIVE) HEART FAILURE (H): ICD-10-CM

## 2025-02-20 DIAGNOSIS — J69.0 PNEUMONITIS DUE TO INHALATION OF FOOD AND VOMIT (H): ICD-10-CM

## 2025-02-20 DIAGNOSIS — E66.01 MORBID (SEVERE) OBESITY DUE TO EXCESS CALORIES (H): ICD-10-CM

## 2025-02-20 DIAGNOSIS — R15.9 FULL INCONTINENCE OF FECES: ICD-10-CM

## 2025-02-20 DIAGNOSIS — I48.19 OTHER PERSISTENT ATRIAL FIBRILLATION (H): ICD-10-CM

## 2025-02-20 DIAGNOSIS — I71.43 INFRARENAL ABDOMINAL AORTIC ANEURYSM, WITHOUT RUPTURE: ICD-10-CM

## 2025-02-20 DIAGNOSIS — G93.40 ENCEPHALOPATHY, UNSPECIFIED: ICD-10-CM

## 2025-02-20 DIAGNOSIS — I25.10 ATHEROSCLEROTIC HEART DISEASE OF NATIVE CORONARY ARTERY WITHOUT ANGINA PECTORIS: ICD-10-CM

## 2025-02-20 DIAGNOSIS — G30.9 ALZHEIMER'S DISEASE, UNSPECIFIED (CODE) (H): ICD-10-CM

## 2025-02-20 DIAGNOSIS — E46 UNSPECIFIED PROTEIN-CALORIE MALNUTRITION: ICD-10-CM

## 2025-02-20 DIAGNOSIS — N18.32 CHRONIC KIDNEY DISEASE, STAGE 3B (H): ICD-10-CM

## 2025-02-20 DIAGNOSIS — R32 UNSPECIFIED URINARY INCONTINENCE: ICD-10-CM

## 2025-02-20 DIAGNOSIS — R13.11 DYSPHAGIA, ORAL PHASE: ICD-10-CM

## 2025-02-20 DIAGNOSIS — J96.01 ACUTE RESPIRATORY FAILURE WITH HYPOXIA (H): ICD-10-CM

## 2025-02-20 DIAGNOSIS — G89.29 OTHER CHRONIC PAIN: ICD-10-CM

## 2025-02-20 DIAGNOSIS — K22.0 ACHALASIA OF CARDIA: ICD-10-CM

## 2025-02-20 DIAGNOSIS — M81.0 AGE-RELATED OSTEOPOROSIS WITHOUT CURRENT PATHOLOGICAL FRACTURE: ICD-10-CM

## 2025-02-20 DIAGNOSIS — E11.69 TYPE 2 DIABETES MELLITUS WITH OTHER SPECIFIED COMPLICATION (H): ICD-10-CM

## 2025-02-20 DIAGNOSIS — I15.2 HYPERTENSION SECONDARY TO ENDOCRINE DISORDERS: ICD-10-CM

## 2025-02-26 LAB
ALBUMIN SERPL BCG-MCNC: 3.7 G/DL (ref 3.5–5.2)
ALP SERPL-CCNC: 49 U/L (ref 40–150)
ALT SERPL W P-5'-P-CCNC: 19 U/L (ref 0–70)
ANION GAP SERPL CALCULATED.3IONS-SCNC: 14 MMOL/L (ref 7–15)
AST SERPL W P-5'-P-CCNC: 31 U/L (ref 0–45)
BILIRUB SERPL-MCNC: 0.5 MG/DL
BUN SERPL-MCNC: 22.4 MG/DL (ref 8–23)
CALCIUM SERPL-MCNC: 9.6 MG/DL (ref 8.8–10.4)
CHLORIDE SERPL-SCNC: 102 MMOL/L (ref 98–107)
CREAT SERPL-MCNC: 1.19 MG/DL (ref 0.51–1.17)
EGFRCR SERPLBLD CKD-EPI 2021: ABNORMAL ML/MIN/{1.73_M2}
ERYTHROCYTE [DISTWIDTH] IN BLOOD BY AUTOMATED COUNT: 13.3 % (ref 10–15)
EST. AVERAGE GLUCOSE BLD GHB EST-MCNC: 134 MG/DL
GLUCOSE SERPL-MCNC: 153 MG/DL (ref 70–99)
HBA1C MFR BLD: 6.3 %
HCO3 SERPL-SCNC: 23 MMOL/L (ref 22–29)
HCT VFR BLD AUTO: 41.2 % (ref 35–53)
HGB BLD-MCNC: 13.5 G/DL (ref 11.7–17.7)
MCH RBC QN AUTO: 32.8 PG (ref 26.5–33)
MCHC RBC AUTO-ENTMCNC: 32.8 G/DL (ref 31.5–36.5)
MCV RBC AUTO: 100 FL (ref 78–100)
PLATELET # BLD AUTO: 164 10E3/UL (ref 150–450)
POTASSIUM SERPL-SCNC: 3.5 MMOL/L (ref 3.4–5.3)
PROT SERPL-MCNC: 6.5 G/DL (ref 6.4–8.3)
PTH-INTACT SERPL-MCNC: 32 PG/ML (ref 15–65)
RBC # BLD AUTO: 4.11 10E6/UL (ref 3.8–5.9)
SODIUM SERPL-SCNC: 139 MMOL/L (ref 135–145)
TSH SERPL DL<=0.005 MIU/L-ACNC: 1.87 UIU/ML (ref 0.3–4.2)
VIT B12 SERPL-MCNC: 476 PG/ML (ref 232–1245)
VIT D+METAB SERPL-MCNC: 39 NG/ML (ref 20–50)
WBC # BLD AUTO: 4.7 10E3/UL (ref 4–11)

## 2025-02-26 PROCEDURE — 83970 ASSAY OF PARATHORMONE: CPT

## 2025-02-26 PROCEDURE — P9604 ONE-WAY ALLOW PRORATED TRIP: HCPCS | Mod: ORL

## 2025-02-26 PROCEDURE — 84443 ASSAY THYROID STIM HORMONE: CPT

## 2025-02-26 PROCEDURE — 82306 VITAMIN D 25 HYDROXY: CPT

## 2025-02-26 PROCEDURE — 80053 COMPREHEN METABOLIC PANEL: CPT | Mod: ORL

## 2025-02-26 PROCEDURE — 83036 HEMOGLOBIN GLYCOSYLATED A1C: CPT

## 2025-02-26 PROCEDURE — 80053 COMPREHEN METABOLIC PANEL: CPT

## 2025-02-26 PROCEDURE — 82607 VITAMIN B-12: CPT

## 2025-02-26 PROCEDURE — 36415 COLL VENOUS BLD VENIPUNCTURE: CPT | Mod: ORL

## 2025-02-26 PROCEDURE — 85027 COMPLETE CBC AUTOMATED: CPT | Mod: ORL

## 2025-02-28 ENCOUNTER — LAB REQUISITION (OUTPATIENT)
Dept: LAB | Facility: CLINIC | Age: 85
End: 2025-02-28
Payer: COMMERCIAL

## 2025-02-28 DIAGNOSIS — R39.0 EXTRAVASATION OF URINE: ICD-10-CM

## 2025-02-28 LAB
ALBUMIN UR-MCNC: 10 MG/DL
AMORPH CRY #/AREA URNS HPF: ABNORMAL /HPF
APPEARANCE UR: ABNORMAL
BACTERIA #/AREA URNS HPF: ABNORMAL /HPF
BILIRUB UR QL STRIP: NEGATIVE
COLOR UR AUTO: YELLOW
GLUCOSE UR STRIP-MCNC: NEGATIVE MG/DL
HGB UR QL STRIP: NEGATIVE
KETONES UR STRIP-MCNC: NEGATIVE MG/DL
LEUKOCYTE ESTERASE UR QL STRIP: ABNORMAL
MUCOUS THREADS #/AREA URNS LPF: PRESENT /LPF
NITRATE UR QL: POSITIVE
PH UR STRIP: 6.5 [PH] (ref 5–7)
RBC URINE: <1 /HPF
SP GR UR STRIP: 1.01 (ref 1–1.03)
SQUAMOUS EPITHELIAL: 1 /HPF
UROBILINOGEN UR STRIP-MCNC: NORMAL MG/DL
WBC URINE: 165 /HPF

## 2025-02-28 PROCEDURE — 87086 URINE CULTURE/COLONY COUNT: CPT | Mod: ORL

## 2025-02-28 PROCEDURE — 81001 URINALYSIS AUTO W/SCOPE: CPT

## 2025-02-28 PROCEDURE — 87086 URINE CULTURE/COLONY COUNT: CPT

## 2025-02-28 PROCEDURE — 81001 URINALYSIS AUTO W/SCOPE: CPT | Mod: ORL

## 2025-03-01 LAB — BACTERIA UR CULT: NORMAL

## 2025-04-21 ENCOUNTER — LAB REQUISITION (OUTPATIENT)
Dept: LAB | Facility: CLINIC | Age: 85
End: 2025-04-21
Payer: COMMERCIAL

## 2025-04-21 DIAGNOSIS — K58.9 IRRITABLE BOWEL SYNDROME, UNSPECIFIED: ICD-10-CM

## 2025-04-21 DIAGNOSIS — I50.22 CHRONIC SYSTOLIC (CONGESTIVE) HEART FAILURE (H): ICD-10-CM

## 2025-04-21 DIAGNOSIS — K22.0 ACHALASIA OF CARDIA: ICD-10-CM

## 2025-04-21 DIAGNOSIS — G30.9 ALZHEIMER'S DISEASE, UNSPECIFIED (CODE) (H): ICD-10-CM

## 2025-04-23 LAB
ANION GAP SERPL CALCULATED.3IONS-SCNC: 18 MMOL/L (ref 7–15)
BUN SERPL-MCNC: 21.5 MG/DL (ref 8–23)
CALCIUM SERPL-MCNC: 10.3 MG/DL (ref 8.8–10.4)
CHLORIDE SERPL-SCNC: 100 MMOL/L (ref 98–107)
CREAT SERPL-MCNC: 0.98 MG/DL (ref 0.51–0.95)
EGFRCR SERPLBLD CKD-EPI 2021: 56 ML/MIN/1.73M2
GLUCOSE SERPL-MCNC: 127 MG/DL (ref 70–99)
HCO3 SERPL-SCNC: 22 MMOL/L (ref 22–29)
POTASSIUM SERPL-SCNC: 4.4 MMOL/L (ref 3.4–5.3)
SODIUM SERPL-SCNC: 140 MMOL/L (ref 135–145)

## 2025-04-23 PROCEDURE — 80048 BASIC METABOLIC PNL TOTAL CA: CPT | Mod: ORL

## 2025-04-23 PROCEDURE — P9604 ONE-WAY ALLOW PRORATED TRIP: HCPCS | Mod: ORL

## 2025-04-23 PROCEDURE — 36415 COLL VENOUS BLD VENIPUNCTURE: CPT | Mod: ORL

## 2025-05-15 ENCOUNTER — LAB REQUISITION (OUTPATIENT)
Dept: LAB | Facility: CLINIC | Age: 85
End: 2025-05-15
Payer: COMMERCIAL

## 2025-05-15 DIAGNOSIS — I50.22 CHRONIC SYSTOLIC (CONGESTIVE) HEART FAILURE (H): ICD-10-CM

## 2025-05-15 DIAGNOSIS — R32 UNSPECIFIED URINARY INCONTINENCE: ICD-10-CM

## 2025-05-15 DIAGNOSIS — K58.9 IRRITABLE BOWEL SYNDROME, UNSPECIFIED: ICD-10-CM

## 2025-05-15 DIAGNOSIS — F32.A DEPRESSION, UNSPECIFIED: ICD-10-CM

## 2025-05-21 LAB
ANION GAP SERPL CALCULATED.3IONS-SCNC: 10 MMOL/L (ref 7–15)
BUN SERPL-MCNC: 20.3 MG/DL (ref 8–23)
CALCIUM SERPL-MCNC: 9.5 MG/DL (ref 8.8–10.4)
CHLORIDE SERPL-SCNC: 106 MMOL/L (ref 98–107)
CREAT SERPL-MCNC: 1 MG/DL (ref 0.51–0.95)
EGFRCR SERPLBLD CKD-EPI 2021: 55 ML/MIN/1.73M2
GLUCOSE SERPL-MCNC: 132 MG/DL (ref 70–99)
HCO3 SERPL-SCNC: 26 MMOL/L (ref 22–29)
MAGNESIUM SERPL-MCNC: 1.6 MG/DL (ref 1.7–2.3)
POTASSIUM SERPL-SCNC: 4.6 MMOL/L (ref 3.4–5.3)
SODIUM SERPL-SCNC: 142 MMOL/L (ref 135–145)

## 2025-05-21 PROCEDURE — 83735 ASSAY OF MAGNESIUM: CPT | Mod: ORL

## 2025-05-21 PROCEDURE — P9604 ONE-WAY ALLOW PRORATED TRIP: HCPCS | Mod: ORL

## 2025-05-21 PROCEDURE — 36415 COLL VENOUS BLD VENIPUNCTURE: CPT | Mod: ORL

## 2025-05-21 PROCEDURE — 80048 BASIC METABOLIC PNL TOTAL CA: CPT | Mod: ORL

## 2025-07-17 ENCOUNTER — LAB REQUISITION (OUTPATIENT)
Dept: LAB | Facility: CLINIC | Age: 85
End: 2025-07-17
Payer: COMMERCIAL

## 2025-07-17 DIAGNOSIS — R32 UNSPECIFIED URINARY INCONTINENCE: ICD-10-CM

## 2025-07-17 DIAGNOSIS — K58.9 IRRITABLE BOWEL SYNDROME, UNSPECIFIED: ICD-10-CM

## 2025-07-17 DIAGNOSIS — E83.42 HYPOMAGNESEMIA: ICD-10-CM

## 2025-07-17 DIAGNOSIS — I50.22 CHRONIC SYSTOLIC (CONGESTIVE) HEART FAILURE (H): ICD-10-CM

## 2025-07-17 DIAGNOSIS — E11.69 TYPE 2 DIABETES MELLITUS WITH OTHER SPECIFIED COMPLICATION (H): ICD-10-CM

## 2025-07-17 DIAGNOSIS — Z71.89 OTHER SPECIFIED COUNSELING: ICD-10-CM

## 2025-07-17 DIAGNOSIS — G30.9 ALZHEIMER'S DISEASE, UNSPECIFIED (CODE) (H): ICD-10-CM

## 2025-07-23 LAB
EST. AVERAGE GLUCOSE BLD GHB EST-MCNC: 131 MG/DL
HBA1C MFR BLD: 6.2 %
MAGNESIUM SERPL-MCNC: 1.9 MG/DL (ref 1.7–2.3)

## 2025-07-23 PROCEDURE — P9603 ONE-WAY ALLOW PRORATED MILES: HCPCS | Mod: ORL

## 2025-07-23 PROCEDURE — 36415 COLL VENOUS BLD VENIPUNCTURE: CPT | Mod: ORL

## 2025-07-23 PROCEDURE — 83735 ASSAY OF MAGNESIUM: CPT | Mod: ORL

## 2025-07-23 PROCEDURE — 83036 HEMOGLOBIN GLYCOSYLATED A1C: CPT | Mod: ORL
